# Patient Record
Sex: MALE | Race: BLACK OR AFRICAN AMERICAN | NOT HISPANIC OR LATINO
[De-identification: names, ages, dates, MRNs, and addresses within clinical notes are randomized per-mention and may not be internally consistent; named-entity substitution may affect disease eponyms.]

---

## 2022-06-14 ENCOUNTER — APPOINTMENT (OUTPATIENT)
Dept: ORTHOPEDIC SURGERY | Facility: CLINIC | Age: 67
End: 2022-06-14
Payer: MEDICARE

## 2022-06-14 VITALS — BODY MASS INDEX: 26.92 KG/M2 | WEIGHT: 188 LBS | HEIGHT: 70 IN

## 2022-06-14 DIAGNOSIS — E78.00 PURE HYPERCHOLESTEROLEMIA, UNSPECIFIED: ICD-10-CM

## 2022-06-14 PROCEDURE — 72100 X-RAY EXAM L-S SPINE 2/3 VWS: CPT

## 2022-06-14 PROCEDURE — 99215 OFFICE O/P EST HI 40 MIN: CPT

## 2022-06-14 PROCEDURE — 72170 X-RAY EXAM OF PELVIS: CPT

## 2022-06-14 RX ORDER — MELOXICAM 15 MG/1
15 TABLET ORAL
Qty: 30 | Refills: 2 | Status: ACTIVE | COMMUNITY
Start: 2022-06-14 | End: 1900-01-01

## 2022-06-14 RX ORDER — METHYLPREDNISOLONE 4 MG/1
4 TABLET ORAL
Qty: 1 | Refills: 0 | Status: ACTIVE | COMMUNITY
Start: 2022-06-14 | End: 1900-01-01

## 2022-06-14 NOTE — ASSESSMENT
[FreeTextEntry1] : 66 year M with left hip OA and lumbar strain\par - physical therapy, NSAIDs, MDP for [place]\par - Return in 6 weeks for follow up

## 2022-06-14 NOTE — IMAGING
[de-identified] : Constitutional: well developed and well nourished, able to communicate\par Cardiovascular: Peripheral vascular exam is grossly normal\par Neurologic: Alert and oriented, no acute distress.\par Skin: normal skin with no ulcers, rashes, or lesions\par Pulmonary: No respiratory distress, breathing comfortably on room air\par Lymphatics: No obvious lymphadenopathy or lymphedema in areas examined\par \par LUMBAR EXAM\par Alignment: normal\par Scoliosis: none\par Spinous process: no tenderness\par Thoracic paraspinal tenderness: no tenderness\par Lumbar Paraspinal tenderness: POS tenderness\par \par RANGE OF MOTION\par full with pain\par \par MOTOR EXAM\par Hip Flexion: 5 /5\par Knee Extension: 5 /5\par Knee Flexion: 5 /5\par Ankle Dorsiflexion: 5 /5\par Ankle plantar flexion: 5 /5\par Toe Extension: 5 /5\par \par Straight leg sign: negative\par Sensation intact L2-S1 nerve root distribution\par Toes warm and well perfused\par \par IMAGING:\par 06/14/2022 Xrays of the lumbar spine and pelvis were taken demonstrating tonnis grade 3 b/l hips and L1/2 DDD with osteophytes and L5/S1 DDD\par \par \par

## 2022-07-26 ENCOUNTER — APPOINTMENT (OUTPATIENT)
Dept: ORTHOPEDIC SURGERY | Facility: CLINIC | Age: 67
End: 2022-07-26

## 2022-11-22 ENCOUNTER — APPOINTMENT (OUTPATIENT)
Dept: ORTHOPEDIC SURGERY | Facility: CLINIC | Age: 67
End: 2022-11-22

## 2022-11-22 VITALS — BODY MASS INDEX: 26.92 KG/M2 | WEIGHT: 188 LBS | HEIGHT: 70 IN

## 2022-11-22 PROCEDURE — 99213 OFFICE O/P EST LOW 20 MIN: CPT

## 2022-11-22 NOTE — ASSESSMENT
[FreeTextEntry1] : 66 year M with left hip OA and lumbar strain\par - physical therapy, NSAIDs, MDP for [place]\par - Return in 6 weeks for follow up \par \par 11/22/2022 continues to have hip pain with likley overlying lumbar radic.\par - did not take MDP and did some PT\par - will peform dx and therapeutic hip injection under guidance.

## 2022-11-22 NOTE — IMAGING
[de-identified] : Constitutional: well developed and well nourished, able to communicate\par Cardiovascular: Peripheral vascular exam is grossly normal\par Neurologic: Alert and oriented, no acute distress.\par Skin: normal skin with no ulcers, rashes, or lesions\par Pulmonary: No respiratory distress, breathing comfortably on room air\par Lymphatics: No obvious lymphadenopathy or lymphedema in areas examined\par \par LUMBAR EXAM\par Alignment: normal\par Scoliosis: none\par Spinous process: no tenderness\par Thoracic paraspinal tenderness: no tenderness\par Lumbar Paraspinal tenderness: POS tenderness\par \par RANGE OF MOTION\par full with pain\par \par MOTOR EXAM\par Hip Flexion: 5 /5\par Knee Extension: 5 /5\par Knee Flexion: 5 /5\par Ankle Dorsiflexion: 5 /5\par Ankle plantar flexion: 5 /5\par Toe Extension: 5 /5\par \par Straight leg sign: negative\par Sensation intact L2-S1 nerve root distribution\par Toes warm and well perfused\par \par IMAGING:\par 06/14/2022 Xrays of the lumbar spine and pelvis were taken demonstrating tonnis grade 3 b/l hips and L1/2 DDD with osteophytes and L5/S1 DDD\par \par \par

## 2022-11-22 NOTE — HISTORY OF PRESENT ILLNESS
[Gradual] : gradual [7] : 7 [2] : 2 [Dull/Aching] : dull/aching [Throbbing] : throbbing [Frequent] : frequent [Meds] : meds [Heat] : heat [Nothing helps with pain getting better] : Nothing helps with pain getting better [de-identified] : This is Mr. RAMON ALAS  a 66 year male who comes in today complaining of lower back and left hip pain for months with no injury.  Pain on the lateral aspect of the hip and lower back. Denies numbness/tingling. +heat, +Aleve.\par \par 11/22/2022 Mr. RAMON ALAS M  here for eval of the left hip. Patient stated they feel worse  since their last visit. Patient mentioned having pain in the leg at the arch of the foot/calf/ thigh. \par  [] : no [FreeTextEntry9] : Aleve

## 2022-12-01 ENCOUNTER — APPOINTMENT (OUTPATIENT)
Dept: PAIN MANAGEMENT | Facility: CLINIC | Age: 67
End: 2022-12-01
Payer: MEDICARE

## 2022-12-01 PROCEDURE — 27093 INJECTION FOR HIP X-RAY: CPT | Mod: LT

## 2022-12-01 PROCEDURE — 77002 NEEDLE LOCALIZATION BY XRAY: CPT | Mod: LT

## 2022-12-01 NOTE — PROCEDURE
[FreeTextEntry3] : DATE OF SURGERY: 12/1/22\par \par OPERATIVE SITE:  Left hip\par \par PREOPERATIVE DIAGNOSIS(ES):  hip osteoarthritis \par \par POSTOPERATIVE DIAGNOSIS(ES):  hip osteoarthritis\par \par PROCEDURE(S): Left hip injection\par \par SURGEON:  Paddy Infante MD\par \par ANESTHESIA:  Local\par \par COMPLICATIONS:  None.\par \par INDICATIONS:  This patient has severe OA of the hip. In order to alleviate pain and restore mobility the patient was consented for the above procedure.  Informed consent was obtained for the procedure. \par  \par PROCEDURE IN DETAIL:\par  \par The patient was taken to the procedure room and placed onto the procedure table. The patient was prepped in the usual sterile fashion. A proper timeout was performed prior to procedure.\par  \par The proper landmarks of the ASIS and greater trochanter was marked. Fluoroscopy was used to localize the needle on the surface of the skin. 5cc of 1% lidocaine was injected subcutaneously. Next a 3.5" 22G needle was advanced towards the neck of the femur until bone was contacted. Omnipaque was injected once the hip capsule was penetrated and fluoroscopy demonstrated intra-articular localization.\par \par Afterwards the syringe was removed and 1cc of 40mg of kenalog and 4cc of 1% lidocaine was injected. A dry sterile dressing was placed at the injection site. There were no complications during the procedure.\par

## 2022-12-20 ENCOUNTER — APPOINTMENT (OUTPATIENT)
Dept: ORTHOPEDIC SURGERY | Facility: CLINIC | Age: 67
End: 2022-12-20

## 2023-01-03 ENCOUNTER — APPOINTMENT (OUTPATIENT)
Dept: ORTHOPEDIC SURGERY | Facility: CLINIC | Age: 68
End: 2023-01-03
Payer: MEDICARE

## 2023-01-03 VITALS — WEIGHT: 188 LBS | BODY MASS INDEX: 26.92 KG/M2 | HEIGHT: 70 IN

## 2023-01-03 PROCEDURE — 99213 OFFICE O/P EST LOW 20 MIN: CPT

## 2023-01-03 NOTE — REASON FOR VISIT
[FreeTextEntry2] : back/left hip\par 11/22/2022 Follow up : left hip \par 01/03/2023 Follow up : left hip; Pt had a fluoroscopy hip inj \par \par

## 2023-01-03 NOTE — IMAGING
[de-identified] : Constitutional: well developed and well nourished, able to communicate\par Cardiovascular: Peripheral vascular exam is grossly normal\par Neurologic: Alert and oriented, no acute distress.\par Skin: normal skin with no ulcers, rashes, or lesions\par Pulmonary: No respiratory distress, breathing comfortably on room air\par Lymphatics: No obvious lymphadenopathy or lymphedema in areas examined\par \par LUMBAR EXAM\par Alignment: normal\par Scoliosis: none\par Spinous process: no tenderness\par Thoracic paraspinal tenderness: no tenderness\par Lumbar Paraspinal tenderness: POS tenderness\par \par RANGE OF MOTION\par full with pain\par \par MOTOR EXAM\par Hip Flexion: 5 /5\par Knee Extension: 5 /5\par Knee Flexion: 5 /5\par Ankle Dorsiflexion: 5 /5\par Ankle plantar flexion: 5 /5\par Toe Extension: 5 /5\par \par Straight leg sign: negative\par Sensation intact L2-S1 nerve root distribution\par Toes warm and well perfused\par \par IMAGING:\par 06/14/2022 Xrays of the lumbar spine and pelvis were taken demonstrating tonnis grade 3 b/l hips and L1/2 DDD with osteophytes and L5/S1 DDD\par \par \par

## 2023-01-03 NOTE — HISTORY OF PRESENT ILLNESS
[Gradual] : gradual [1] : 2 [0] : 0 [Dull/Aching] : dull/aching [Occasional] : occasional [Walking] : walking [Retired] : Work status: retired [de-identified] : This is Mr. RAMON ALAS  a 66 year male who comes in today complaining of lower back and left hip pain for months with no injury.  Pain on the lateral aspect of the hip and lower back. Denies numbness/tingling. +heat, +Aleve.\par \par 11/22/2022 Mr. RAMON HINSON  here for eval of the left hip. Patient stated they feel worse  since their last visit. Patient mentioned having pain in the leg at the arch of the foot/calf/ thigh. \par \par 01/03/2023 Mr. RMAON HINSON  here for eval of the left hip. Patient stated his hip feels very good. States pain has mostly resolved.  No further pain down the leg. \par \par  [] : no [FreeTextEntry1] : left hip  [FreeTextEntry9] : hip injection

## 2023-01-03 NOTE — ASSESSMENT
[FreeTextEntry1] : 66 year M with left hip OA and lumbar strain\par - physical therapy, NSAIDs, MDP for [place]\par - Return in 6 weeks for follow up \par \par 11/22/2022 continues to have hip pain with likley overlying lumbar radic.\par - did not take MDP and did some PT\par - will peform dx and therapeutic hip injection under guidance.\par \par 1/3/23: Good relieve with IA CSI. Follow up PRN. Discussed possibility of eventual AMAURY.

## 2023-03-07 ENCOUNTER — APPOINTMENT (OUTPATIENT)
Dept: ORTHOPEDIC SURGERY | Facility: CLINIC | Age: 68
End: 2023-03-07
Payer: MEDICARE

## 2023-03-07 VITALS — WEIGHT: 188 LBS | BODY MASS INDEX: 26.92 KG/M2 | HEIGHT: 70 IN

## 2023-03-07 PROCEDURE — 99214 OFFICE O/P EST MOD 30 MIN: CPT

## 2023-03-07 RX ORDER — IBUPROFEN 800 MG/1
800 TABLET, FILM COATED ORAL 3 TIMES DAILY
Qty: 42 | Refills: 0 | Status: ACTIVE | COMMUNITY
Start: 2023-03-07 | End: 1900-01-01

## 2023-03-07 NOTE — IMAGING
[de-identified] : Constitutional: well developed and well nourished, able to communicate\par Cardiovascular: Peripheral vascular exam is grossly normal\par Neurologic: Alert and oriented, no acute distress.\par Skin: normal skin with no ulcers, rashes, or lesions\par Pulmonary: No respiratory distress, breathing comfortably on room air\par Lymphatics: No obvious lymphadenopathy or lymphedema in areas examined\par \par LUMBAR EXAM\par Alignment: normal\par Scoliosis: none\par Spinous process: no tenderness\par Thoracic paraspinal tenderness: no tenderness\par Lumbar Paraspinal tenderness: POS tenderness\par \par RANGE OF MOTION\par full with pain\par \par MOTOR EXAM\par Hip Flexion: 5 /5\par Knee Extension: 5 /5\par Knee Flexion: 5 /5\par Ankle Dorsiflexion: 5 /5\par Ankle plantar flexion: 5 /5\par Toe Extension: 5 /5\par \par Straight leg sign: negative\par Sensation intact L2-S1 nerve root distribution\par Toes warm and well perfused\par \par IMAGING:\par 06/14/2022 Xrays of the lumbar spine and pelvis were taken demonstrating tonnis grade 3 b/l hips and L1/2 DDD with osteophytes and L5/S1 DDD\par \par \par

## 2023-03-07 NOTE — ASSESSMENT
[FreeTextEntry1] : 66 year M with left hip OA and lumbar strain\par - physical therapy, NSAIDs, MDP for [place]\par - Return in 6 weeks for follow up \par \par 11/22/2022 continues to have hip pain with likley overlying lumbar radic.\par - did not take MDP and did some PT\par - will peform dx and therapeutic hip injection under guidance.\par \par 1/3/23: Good relieve with IA CSI. Follow up PRN. Discussed possibility of eventual AMAURY.\par \par 03/07/2023 pain is back after 4 months from injection. Follow up Dr. Evans for injection CSI

## 2023-03-07 NOTE — HISTORY OF PRESENT ILLNESS
[Gradual] : gradual [1] : 2 [0] : 0 [Dull/Aching] : dull/aching [Occasional] : occasional [Walking] : walking [Retired] : Work status: retired [de-identified] : This is Mr. RAMON ALAS  a 66 year male who comes in today complaining of lower back and left hip pain for months with no injury.  Pain on the lateral aspect of the hip and lower back. Denies numbness/tingling. +heat, +Aleve.\par \par 11/22/2022 Mr. RAMON HINSON  here for eval of the left hip. Patient stated they feel worse  since their last visit. Patient mentioned having pain in the leg at the arch of the foot/calf/ thigh. \par \par 01/03/2023 Mr. RAMON ALAS M  here for eval of the left hip. Patient stated his hip feels very good. States pain has mostly resolved.  No further pain down the leg. \par \par  03/07/2023 follow up left hip/ States he is feeling worse since last visit. it appears injection is wearing off.\par \par  [] : no [FreeTextEntry1] : left hip  [FreeTextEntry9] : hip injection

## 2023-03-16 ENCOUNTER — APPOINTMENT (OUTPATIENT)
Age: 68
End: 2023-03-16
Payer: MEDICARE

## 2023-03-16 PROCEDURE — 73525 CONTRAST X-RAY OF HIP: CPT | Mod: 26

## 2023-03-16 PROCEDURE — 27093 INJECTION FOR HIP X-RAY: CPT | Mod: LT

## 2023-08-15 ENCOUNTER — APPOINTMENT (OUTPATIENT)
Dept: ORTHOPEDIC SURGERY | Facility: CLINIC | Age: 68
End: 2023-08-15
Payer: MEDICARE

## 2023-08-15 VITALS — WEIGHT: 188 LBS | BODY MASS INDEX: 26.92 KG/M2 | HEIGHT: 70 IN

## 2023-08-15 PROCEDURE — 73503 X-RAY EXAM HIP UNI 4/> VIEWS: CPT | Mod: LT

## 2023-08-15 PROCEDURE — 99214 OFFICE O/P EST MOD 30 MIN: CPT

## 2023-08-15 NOTE — HISTORY OF PRESENT ILLNESS
[Gradual] : gradual [1] : 2 [0] : 0 [Dull/Aching] : dull/aching [Occasional] : occasional [Walking] : walking [Retired] : Work status: retired [de-identified] : This is Mr. RAMON ALAS  a 66 year male who comes in today complaining of lower back and left hip pain for months with no injury.  Pain on the lateral aspect of the hip and lower back. Denies numbness/tingling. +heat, +Aleve.  11/22/2022 Mr. RAMON HINSON  here for eval of the left hip. Patient stated they feel worse  since their last visit. Patient mentioned having pain in the leg at the arch of the foot/calf/ thigh.   01/03/2023 Mr. RAMON HINSON  here for eval of the left hip. Patient stated his hip feels very good. States pain has mostly resolved.  No further pain down the leg.    03/07/2023 follow up left hip/ States he is feeling worse since last visit. it appears injection is wearing off.  08/15/2023 follow up left hip. States CSI helped temporarily. Did not last as long as the previous one.  [] : no [FreeTextEntry1] : left hip  [FreeTextEntry9] : hip injection

## 2023-08-15 NOTE — IMAGING
[de-identified] : Constitutional: well developed and well nourished, able to communicate\par  Cardiovascular: Peripheral vascular exam is grossly normal\par  Neurologic: Alert and oriented, no acute distress.\par  Skin: normal skin with no ulcers, rashes, or lesions\par  Pulmonary: No respiratory distress, breathing comfortably on room air\par  Lymphatics: No obvious lymphadenopathy or lymphedema in areas examined\par  \par  LUMBAR EXAM\par  Alignment: normal\par  Scoliosis: none\par  Spinous process: no tenderness\par  Thoracic paraspinal tenderness: no tenderness\par  Lumbar Paraspinal tenderness: POS tenderness\par  \par  RANGE OF MOTION\par  full with pain\par  \par  MOTOR EXAM\par  Hip Flexion: 5 /5\par  Knee Extension: 5 /5\par  Knee Flexion: 5 /5\par  Ankle Dorsiflexion: 5 /5\par  Ankle plantar flexion: 5 /5\par  Toe Extension: 5 /5\par  \par  Straight leg sign: negative\par  Sensation intact L2-S1 nerve root distribution\par  Toes warm and well perfused\par  \par  IMAGING:\par  06/14/2022 Xrays of the lumbar spine and pelvis were taken demonstrating tonnis grade 3 b/l hips and L1/2 DDD with osteophytes and L5/S1 DDD\par  \par  \par

## 2023-08-15 NOTE — ASSESSMENT
[FreeTextEntry1] : 66 year M with left hip OA and lumbar strain - physical therapy, NSAIDs, MDP for [place] - Return in 6 weeks for follow up   11/22/2022 continues to have hip pain with likley overlying lumbar radic. - did not take MDP and did some PT - will peform dx and therapeutic hip injection under guidance.  1/3/23: Good relieve with IA CSI. Follow up PRN. Discussed possibility of eventual AMAURY.  03/07/2023 pain is back after 4 months from injection. Follow up Dr. Evans for injection CSI  8/15/23: all preop questions and concerns answered

## 2023-09-12 ENCOUNTER — OUTPATIENT (OUTPATIENT)
Dept: OUTPATIENT SERVICES | Facility: HOSPITAL | Age: 68
LOS: 1 days | Discharge: HOME | End: 2023-09-12

## 2023-09-12 ENCOUNTER — APPOINTMENT (OUTPATIENT)
Dept: ORTHOPEDIC SURGERY | Facility: CLINIC | Age: 68
End: 2023-09-12
Payer: MEDICARE

## 2023-09-12 VITALS
RESPIRATION RATE: 17 BRPM | HEART RATE: 75 BPM | TEMPERATURE: 98 F | HEIGHT: 70 IN | SYSTOLIC BLOOD PRESSURE: 124 MMHG | OXYGEN SATURATION: 98 % | DIASTOLIC BLOOD PRESSURE: 88 MMHG | WEIGHT: 198.64 LBS

## 2023-09-12 DIAGNOSIS — M16.12 UNILATERAL PRIMARY OSTEOARTHRITIS, LEFT HIP: ICD-10-CM

## 2023-09-12 DIAGNOSIS — M16.11 UNILATERAL PRIMARY OSTEOARTHRITIS, RIGHT HIP: ICD-10-CM

## 2023-09-12 DIAGNOSIS — Z98.890 OTHER SPECIFIED POSTPROCEDURAL STATES: Chronic | ICD-10-CM

## 2023-09-12 DIAGNOSIS — Z01.818 ENCOUNTER FOR OTHER PREPROCEDURAL EXAMINATION: ICD-10-CM

## 2023-09-12 DIAGNOSIS — E78.5 HYPERLIPIDEMIA, UNSPECIFIED: ICD-10-CM

## 2023-09-12 DIAGNOSIS — E11.9 TYPE 2 DIABETES MELLITUS WITHOUT COMPLICATIONS: ICD-10-CM

## 2023-09-12 DIAGNOSIS — J38.1 POLYP OF VOCAL CORD AND LARYNX: Chronic | ICD-10-CM

## 2023-09-12 DIAGNOSIS — Z01.812 ENCOUNTER FOR PREPROCEDURAL LABORATORY EXAMINATION: ICD-10-CM

## 2023-09-12 LAB
A1C WITH ESTIMATED AVERAGE GLUCOSE RESULT: 6.1 % — HIGH (ref 4–5.6)
ALBUMIN SERPL ELPH-MCNC: 4.1 G/DL — SIGNIFICANT CHANGE UP (ref 3.3–5)
ALP SERPL-CCNC: 66 U/L — SIGNIFICANT CHANGE UP (ref 40–120)
ALT FLD-CCNC: 47 U/L — SIGNIFICANT CHANGE UP (ref 12–78)
ANION GAP SERPL CALC-SCNC: 4 MMOL/L — LOW (ref 5–17)
APTT BLD: 35.8 SEC — HIGH (ref 24.5–35.6)
AST SERPL-CCNC: 28 U/L — SIGNIFICANT CHANGE UP (ref 15–37)
BASOPHILS # BLD AUTO: 0.05 K/UL — SIGNIFICANT CHANGE UP (ref 0–0.2)
BASOPHILS NFR BLD AUTO: 0.7 % — SIGNIFICANT CHANGE UP (ref 0–2)
BILIRUB SERPL-MCNC: 0.5 MG/DL — SIGNIFICANT CHANGE UP (ref 0.2–1.2)
BLD GP AB SCN SERPL QL: SIGNIFICANT CHANGE UP
BUN SERPL-MCNC: 15 MG/DL — SIGNIFICANT CHANGE UP (ref 7–23)
CALCIUM SERPL-MCNC: 9.4 MG/DL — SIGNIFICANT CHANGE UP (ref 8.5–10.1)
CHLORIDE SERPL-SCNC: 110 MMOL/L — HIGH (ref 96–108)
CO2 SERPL-SCNC: 27 MMOL/L — SIGNIFICANT CHANGE UP (ref 22–31)
CREAT SERPL-MCNC: 1.13 MG/DL — SIGNIFICANT CHANGE UP (ref 0.5–1.3)
EGFR: 72 ML/MIN/1.73M2 — SIGNIFICANT CHANGE UP
EOSINOPHIL # BLD AUTO: 0.15 K/UL — SIGNIFICANT CHANGE UP (ref 0–0.5)
EOSINOPHIL NFR BLD AUTO: 2 % — SIGNIFICANT CHANGE UP (ref 0–6)
ESTIMATED AVERAGE GLUCOSE: 128 MG/DL — HIGH (ref 68–114)
GLUCOSE SERPL-MCNC: 104 MG/DL — HIGH (ref 70–99)
HCT VFR BLD CALC: 47.3 % — SIGNIFICANT CHANGE UP (ref 39–50)
HGB BLD-MCNC: 15.6 G/DL — SIGNIFICANT CHANGE UP (ref 13–17)
IMM GRANULOCYTES NFR BLD AUTO: 0.1 % — SIGNIFICANT CHANGE UP (ref 0–0.9)
INR BLD: 0.88 RATIO — SIGNIFICANT CHANGE UP (ref 0.85–1.18)
LYMPHOCYTES # BLD AUTO: 2.04 K/UL — SIGNIFICANT CHANGE UP (ref 1–3.3)
LYMPHOCYTES # BLD AUTO: 27.5 % — SIGNIFICANT CHANGE UP (ref 13–44)
MCHC RBC-ENTMCNC: 30.2 PG — SIGNIFICANT CHANGE UP (ref 27–34)
MCHC RBC-ENTMCNC: 33 G/DL — SIGNIFICANT CHANGE UP (ref 32–36)
MCV RBC AUTO: 91.7 FL — SIGNIFICANT CHANGE UP (ref 80–100)
MONOCYTES # BLD AUTO: 0.6 K/UL — SIGNIFICANT CHANGE UP (ref 0–0.9)
MONOCYTES NFR BLD AUTO: 8.1 % — SIGNIFICANT CHANGE UP (ref 2–14)
NEUTROPHILS # BLD AUTO: 4.56 K/UL — SIGNIFICANT CHANGE UP (ref 1.8–7.4)
NEUTROPHILS NFR BLD AUTO: 61.6 % — SIGNIFICANT CHANGE UP (ref 43–77)
NRBC # BLD: 0 /100 WBCS — SIGNIFICANT CHANGE UP (ref 0–0)
PLATELET # BLD AUTO: 225 K/UL — SIGNIFICANT CHANGE UP (ref 150–400)
POTASSIUM SERPL-MCNC: 4.5 MMOL/L — SIGNIFICANT CHANGE UP (ref 3.5–5.3)
POTASSIUM SERPL-SCNC: 4.5 MMOL/L — SIGNIFICANT CHANGE UP (ref 3.5–5.3)
PROT SERPL-MCNC: 8.1 GM/DL — SIGNIFICANT CHANGE UP (ref 6–8.3)
PROTHROM AB SERPL-ACNC: 10.5 SEC — SIGNIFICANT CHANGE UP (ref 9.5–13)
RBC # BLD: 5.16 M/UL — SIGNIFICANT CHANGE UP (ref 4.2–5.8)
RBC # FLD: 12.7 % — SIGNIFICANT CHANGE UP (ref 10.3–14.5)
SODIUM SERPL-SCNC: 141 MMOL/L — SIGNIFICANT CHANGE UP (ref 135–145)
WBC # BLD: 7.41 K/UL — SIGNIFICANT CHANGE UP (ref 3.8–10.5)
WBC # FLD AUTO: 7.41 K/UL — SIGNIFICANT CHANGE UP (ref 3.8–10.5)

## 2023-09-12 PROCEDURE — 99215 OFFICE O/P EST HI 40 MIN: CPT

## 2023-09-12 NOTE — PHYSICAL THERAPY INITIAL EVALUATION ADULT - ADDITIONAL COMMENTS
Home set-up: lives with domestic partner 2nd floor of private house with 6 stair steps to enter with x2 handrails, wide apart to enter at front, then another 6 steps to 2nd floor c closer x2 rails. Bedroom is & bathroom at same floor. Bathroom is a step-in shower without grab rail but with a shower stool; a standard height toilet seat, with fixed shower head. Endorses will be supported by partner post-op. Patient is currently independent with mobility. Patient endorses typical pain at best is 5/10, at worst is 8/10. Per patient, pain is worse with increased standing, walking and sometimes turning. Pain is relieved by sitting/resting. Patient is (R)-handed and drives; wears progressive glasses. Patient denies falls in past 6 months. Sometimes terence when turning.

## 2023-09-12 NOTE — PHYSICAL THERAPY INITIAL EVALUATION ADULT - SINGLE LEG BALANCE TEST, REHAB EVAL
One Leg Stand Test (OLST): Right: 5 secs Left: 5 secs.  Functional test to assess fall risk. Both gait and stair negotiation require components of OLST. Participants unable to perform the OLST for at least 5 seconds are at increased risk for injurious fall.

## 2023-09-12 NOTE — H&P PST ADULT - ASSESSMENT
left hip osteoarthritis  CAPRINI SCORE    AGE RELATED RISK FACTORS                                                       MOBILITY RELATED FACTORS  [ ] Age 41-60 years                                            (1 Point)                  [ ] Bed rest                                                        (1 Point)  [x ] Age: 61-74 years                                           (2 Points)                [ ] Plaster cast                                                   (2 Points)  [ ] Age= 75 years                                              (3 Points)                 [ ] Bed bound for more than 72 hours                   (2 Points)    DISEASE RELATED RISK FACTORS                                               GENDER SPECIFIC FACTORS  [ ] Edema in the lower extremities                       (1 Point)                  [ ] Pregnancy                                                     (1 Point)  [ ] Varicose veins                                               (1 Point)                  [ ] Post-partum < 6 weeks                                   (1 Point)             [x ] BMI > 25 Kg/m2                                            (1 Point)                  [ ] Hormonal therapy  or oral contraception            (1 Point)                 [ ] Sepsis (in the previous month)                        (1 Point)                  [ ] History of pregnancy complications  [ ] Pneumonia or serious lung disease                                               [ ] Unexplained or recurrent                       (1 Point)           (in the previous month)                               (1 Point)  [ ] Abnormal pulmonary function test                     (1 Point)                 SURGERY RELATED RISK FACTORS  [ ] Acute myocardial infarction                              (1 Point)                 [ ]  Section                                            (1 Point)  [ ] Congestive heart failure (in the previous month)  (1 Point)                 [ ] Minor surgery                                                 (1 Point)   [ ] Inflammatory bowel disease                             (1 Point)                 [ ] Arthroscopic surgery                                        (2 Points)  [ ] Central venous access                                    (2 Points)                [ ] General surgery lasting more than 45 minutes   (2 Points)       [ ] Stroke (in the previous month)                          (5 Points)               [x ] Elective arthroplasty                                        (5 Points)                                                                                                                                               HEMATOLOGY RELATED FACTORS                                                 TRAUMA RELATED RISK FACTORS  [ ] Prior episodes of VTE                                     (3 Points)                 [ ] Fracture of the hip, pelvis, or leg                       (5 Points)  [ ] Positive family history for VTE                         (3 Points)                 [ ] Acute spinal cord injury (in the previous month)  (5 Points)  [ ] Prothrombin 56438 A                                      (3 Points)                 [ ] Paralysis  (less than 1 month)                          (5 Points)  [ ] Factor V Leiden                                             (3 Points)                 [ ] Multiple Trauma within 1 month                         (5 Points)  [ ] Lupus anticoagulants                                     (3 Points)                                                           [ ] Anticardiolipin antibodies                                (3 Points)                                                       [ ] High homocysteine in the blood                      (3 Points)                                             [ ] Other congenital or acquired thrombophilia       (3 Points)                                                [ ] Heparin induced thrombocytopenia                  (3 Points)                                          Total Score [    8      ]   Caprini Score 0-2: Low risk, No VTE Prophylaxis required for most patient's, encourage ambulation  Caprini Score 3-6: At Risk, Pharmacologic VTE prophylaxis is indicated for most patients ( in the absence of a contraindication)  Caprini Score Greater than or = 7: High Risk , pharmacologic VTE is indicated for most patients ( in the absence of a contraindication)    Caprini score indicates that the patient is high risk for VTE event ( score 6 or greater). Surgical patient's in this group will benefit from both pharmacologic prophylaxis and intermittent compression devices . Surgical team will determine the balance between VTE  risk and bleeding risk and other clinical considerations

## 2023-09-12 NOTE — H&P PST ADULT - HISTORY OF PRESENT ILLNESS
o male , pmh- htn, hld, dm c/o left hip pain 2/2 osteoarthritis - scheduled for left  hip arthroplasty  goal: to walk without pain   denies recent travels in the past 30 days. No fever, SOB, cough, flu like symptoms or body rash- covid screen

## 2023-09-12 NOTE — H&P PST ADULT - ATTENDING COMMENTS
I discussed this dx with the family at length. In order to restore the patient's ability to ambulate with minimal pain, a LEFT anterior total hip replacement was recommended. The risks of the procedure were discussed at length which included but not limited to infection, bleeding, and damage to neurovascular structures. Informed consent was obtained.

## 2023-09-12 NOTE — PHYSICAL THERAPY INITIAL EVALUATION ADULT - GENERAL OBSERVATIONS, REHAB EVAL
no rashes , no suspicious lesions , no areas of discoloration , no jaundice present , good turgor , no masses , no tenderness on palpation
Patient encountered sitting in PST waiting area, agreeable to PT pre-op evaluation. Patient is for elective (L) total hip arthroplasty (uncertain at this time if anterior or posterior approach) at a later date from now. Tolerated all aspects of evaluation without undue events, please see further PT documentation for details.

## 2023-09-12 NOTE — PHYSICAL THERAPY INITIAL EVALUATION ADULT - PERTINENT HX OF CURRENT PROBLEM, REHAB EVAL
Patient attends Pre-Op Testing today following consult with Dr. Infante due to chronic pain to (L) hip. Significant surgical/medical histories of throat polypectomy, (L) knee arthroscopy. Elective LTHA is now scheduled in this facility for 10/2/23.

## 2023-09-13 LAB
MRSA PCR RESULT.: SIGNIFICANT CHANGE UP
S AUREUS DNA NOSE QL NAA+PROBE: SIGNIFICANT CHANGE UP
VIT D25+D1,25 OH+D1,25 PNL SERPL-MCNC: 40.7 PG/ML — SIGNIFICANT CHANGE UP (ref 19.9–79.3)

## 2023-09-13 RX ORDER — SODIUM CHLORIDE 9 MG/ML
3 INJECTION INTRAMUSCULAR; INTRAVENOUS; SUBCUTANEOUS EVERY 8 HOURS
Refills: 0 | Status: DISCONTINUED | OUTPATIENT
Start: 2023-10-02 | End: 2023-10-03

## 2023-09-28 RX ORDER — INSULIN LISPRO 100/ML
VIAL (ML) SUBCUTANEOUS
Refills: 0 | Status: DISCONTINUED | OUTPATIENT
Start: 2023-10-02 | End: 2023-10-03

## 2023-09-28 RX ORDER — SODIUM CHLORIDE 9 MG/ML
1000 INJECTION, SOLUTION INTRAVENOUS
Refills: 0 | Status: DISCONTINUED | OUTPATIENT
Start: 2023-10-02 | End: 2023-10-03

## 2023-09-28 RX ORDER — INSULIN LISPRO 100/ML
VIAL (ML) SUBCUTANEOUS AT BEDTIME
Refills: 0 | Status: DISCONTINUED | OUTPATIENT
Start: 2023-10-02 | End: 2023-10-03

## 2023-09-28 RX ORDER — GABAPENTIN 400 MG/1
100 CAPSULE ORAL THREE TIMES A DAY
Refills: 0 | Status: DISCONTINUED | OUTPATIENT
Start: 2023-10-02 | End: 2023-10-03

## 2023-09-28 RX ORDER — SENNA PLUS 8.6 MG/1
2 TABLET ORAL AT BEDTIME
Refills: 0 | Status: DISCONTINUED | OUTPATIENT
Start: 2023-10-02 | End: 2023-10-03

## 2023-09-28 RX ORDER — GLUCAGON INJECTION, SOLUTION 0.5 MG/.1ML
1 INJECTION, SOLUTION SUBCUTANEOUS ONCE
Refills: 0 | Status: DISCONTINUED | OUTPATIENT
Start: 2023-10-02 | End: 2023-10-03

## 2023-09-28 RX ORDER — ATORVASTATIN CALCIUM 80 MG/1
80 TABLET, FILM COATED ORAL AT BEDTIME
Refills: 0 | Status: DISCONTINUED | OUTPATIENT
Start: 2023-10-02 | End: 2023-10-03

## 2023-09-28 RX ORDER — DEXTROSE 50 % IN WATER 50 %
15 SYRINGE (ML) INTRAVENOUS ONCE
Refills: 0 | Status: DISCONTINUED | OUTPATIENT
Start: 2023-10-02 | End: 2023-10-03

## 2023-09-28 RX ORDER — ACETAMINOPHEN 500 MG
1000 TABLET ORAL ONCE
Refills: 0 | Status: COMPLETED | OUTPATIENT
Start: 2023-10-02 | End: 2023-10-02

## 2023-09-28 RX ORDER — OXYCODONE HYDROCHLORIDE 5 MG/1
10 TABLET ORAL
Refills: 0 | Status: DISCONTINUED | OUTPATIENT
Start: 2023-10-02 | End: 2023-10-03

## 2023-09-28 RX ORDER — ONDANSETRON 8 MG/1
4 TABLET, FILM COATED ORAL EVERY 6 HOURS
Refills: 0 | Status: DISCONTINUED | OUTPATIENT
Start: 2023-10-02 | End: 2023-10-03

## 2023-09-28 RX ORDER — OXYCODONE HYDROCHLORIDE 5 MG/1
5 TABLET ORAL
Refills: 0 | Status: DISCONTINUED | OUTPATIENT
Start: 2023-10-02 | End: 2023-10-03

## 2023-09-28 RX ORDER — ASPIRIN/CALCIUM CARB/MAGNESIUM 324 MG
81 TABLET ORAL
Refills: 0 | Status: DISCONTINUED | OUTPATIENT
Start: 2023-10-03 | End: 2023-10-03

## 2023-09-28 RX ORDER — DEXTROSE 50 % IN WATER 50 %
25 SYRINGE (ML) INTRAVENOUS ONCE
Refills: 0 | Status: DISCONTINUED | OUTPATIENT
Start: 2023-10-02 | End: 2023-10-03

## 2023-09-28 RX ORDER — POLYETHYLENE GLYCOL 3350 17 G/17G
17 POWDER, FOR SOLUTION ORAL AT BEDTIME
Refills: 0 | Status: DISCONTINUED | OUTPATIENT
Start: 2023-10-02 | End: 2023-10-03

## 2023-09-28 RX ORDER — MAGNESIUM HYDROXIDE 400 MG/1
30 TABLET, CHEWABLE ORAL DAILY
Refills: 0 | Status: DISCONTINUED | OUTPATIENT
Start: 2023-10-02 | End: 2023-10-03

## 2023-09-28 RX ORDER — ACETAMINOPHEN 500 MG
1000 TABLET ORAL EVERY 8 HOURS
Refills: 0 | Status: DISCONTINUED | OUTPATIENT
Start: 2023-10-02 | End: 2023-10-03

## 2023-09-28 RX ORDER — SIMVASTATIN 20 MG/1
10 TABLET, FILM COATED ORAL AT BEDTIME
Refills: 0 | Status: DISCONTINUED | OUTPATIENT
Start: 2023-10-02 | End: 2023-10-03

## 2023-09-28 RX ORDER — ASCORBIC ACID 60 MG
500 TABLET,CHEWABLE ORAL
Refills: 0 | Status: DISCONTINUED | OUTPATIENT
Start: 2023-10-02 | End: 2023-10-03

## 2023-09-28 RX ORDER — PANTOPRAZOLE SODIUM 20 MG/1
40 TABLET, DELAYED RELEASE ORAL
Refills: 0 | Status: DISCONTINUED | OUTPATIENT
Start: 2023-10-02 | End: 2023-10-03

## 2023-09-28 RX ORDER — CELECOXIB 200 MG/1
200 CAPSULE ORAL EVERY 12 HOURS
Refills: 0 | Status: DISCONTINUED | OUTPATIENT
Start: 2023-10-03 | End: 2023-10-03

## 2023-09-28 RX ORDER — DEXAMETHASONE 0.5 MG/5ML
8 ELIXIR ORAL ONCE
Refills: 0 | Status: COMPLETED | OUTPATIENT
Start: 2023-10-03 | End: 2023-10-03

## 2023-09-28 RX ORDER — DEXTROSE 50 % IN WATER 50 %
12.5 SYRINGE (ML) INTRAVENOUS ONCE
Refills: 0 | Status: DISCONTINUED | OUTPATIENT
Start: 2023-10-02 | End: 2023-10-03

## 2023-09-28 RX ORDER — HYDROMORPHONE HYDROCHLORIDE 2 MG/ML
0.5 INJECTION INTRAMUSCULAR; INTRAVENOUS; SUBCUTANEOUS
Refills: 0 | Status: DISCONTINUED | OUTPATIENT
Start: 2023-10-02 | End: 2023-10-03

## 2023-09-28 RX ORDER — LANOLIN ALCOHOL/MO/W.PET/CERES
3 CREAM (GRAM) TOPICAL AT BEDTIME
Refills: 0 | Status: DISCONTINUED | OUTPATIENT
Start: 2023-10-02 | End: 2023-10-03

## 2023-10-02 ENCOUNTER — APPOINTMENT (OUTPATIENT)
Dept: ORTHOPEDIC SURGERY | Facility: HOSPITAL | Age: 68
End: 2023-10-02
Payer: MEDICARE

## 2023-10-02 ENCOUNTER — TRANSCRIPTION ENCOUNTER (OUTPATIENT)
Age: 68
End: 2023-10-02

## 2023-10-02 ENCOUNTER — INPATIENT (INPATIENT)
Facility: HOSPITAL | Age: 68
LOS: 0 days | Discharge: HOME HEALTH | End: 2023-10-03
Attending: ORTHOPAEDIC SURGERY | Admitting: ORTHOPAEDIC SURGERY

## 2023-10-02 VITALS
DIASTOLIC BLOOD PRESSURE: 87 MMHG | TEMPERATURE: 98 F | HEIGHT: 70 IN | WEIGHT: 194.01 LBS | SYSTOLIC BLOOD PRESSURE: 130 MMHG | HEART RATE: 87 BPM | RESPIRATION RATE: 17 BRPM | OXYGEN SATURATION: 98 %

## 2023-10-02 DIAGNOSIS — Z98.890 OTHER SPECIFIED POSTPROCEDURAL STATES: Chronic | ICD-10-CM

## 2023-10-02 DIAGNOSIS — J38.1 POLYP OF VOCAL CORD AND LARYNX: Chronic | ICD-10-CM

## 2023-10-02 LAB
ANION GAP SERPL CALC-SCNC: 5 MMOL/L — SIGNIFICANT CHANGE UP (ref 5–17)
BUN SERPL-MCNC: 13 MG/DL — SIGNIFICANT CHANGE UP (ref 7–23)
CALCIUM SERPL-MCNC: 8.1 MG/DL — LOW (ref 8.5–10.1)
CHLORIDE SERPL-SCNC: 110 MMOL/L — HIGH (ref 96–108)
CO2 SERPL-SCNC: 26 MMOL/L — SIGNIFICANT CHANGE UP (ref 22–31)
CREAT SERPL-MCNC: 1.13 MG/DL — SIGNIFICANT CHANGE UP (ref 0.5–1.3)
EGFR: 71 ML/MIN/1.73M2 — SIGNIFICANT CHANGE UP
GLUCOSE BLDC GLUCOMTR-MCNC: 101 MG/DL — HIGH (ref 70–99)
GLUCOSE BLDC GLUCOMTR-MCNC: 174 MG/DL — HIGH (ref 70–99)
GLUCOSE BLDC GLUCOMTR-MCNC: 177 MG/DL — HIGH (ref 70–99)
GLUCOSE BLDC GLUCOMTR-MCNC: 199 MG/DL — HIGH (ref 70–99)
GLUCOSE SERPL-MCNC: 161 MG/DL — HIGH (ref 70–99)
HCT VFR BLD CALC: 40.8 % — SIGNIFICANT CHANGE UP (ref 39–50)
HGB BLD-MCNC: 13.5 G/DL — SIGNIFICANT CHANGE UP (ref 13–17)
MCHC RBC-ENTMCNC: 30.5 PG — SIGNIFICANT CHANGE UP (ref 27–34)
MCHC RBC-ENTMCNC: 33.1 G/DL — SIGNIFICANT CHANGE UP (ref 32–36)
MCV RBC AUTO: 92.3 FL — SIGNIFICANT CHANGE UP (ref 80–100)
NRBC # BLD: 0 /100 WBCS — SIGNIFICANT CHANGE UP (ref 0–0)
PLATELET # BLD AUTO: 184 K/UL — SIGNIFICANT CHANGE UP (ref 150–400)
POTASSIUM SERPL-MCNC: 4.8 MMOL/L — SIGNIFICANT CHANGE UP (ref 3.5–5.3)
POTASSIUM SERPL-SCNC: 4.8 MMOL/L — SIGNIFICANT CHANGE UP (ref 3.5–5.3)
RBC # BLD: 4.42 M/UL — SIGNIFICANT CHANGE UP (ref 4.2–5.8)
RBC # FLD: 12.7 % — SIGNIFICANT CHANGE UP (ref 10.3–14.5)
SODIUM SERPL-SCNC: 141 MMOL/L — SIGNIFICANT CHANGE UP (ref 135–145)
WBC # BLD: 9.79 K/UL — SIGNIFICANT CHANGE UP (ref 3.8–10.5)
WBC # FLD AUTO: 9.79 K/UL — SIGNIFICANT CHANGE UP (ref 3.8–10.5)

## 2023-10-02 PROCEDURE — 27130 TOTAL HIP ARTHROPLASTY: CPT | Mod: LT

## 2023-10-02 DEVICE — SHELL ACET G7 OSSEOTI F HEMISPHR 4 HOLE 54MM: Type: IMPLANTABLE DEVICE | Site: LEFT | Status: FUNCTIONAL

## 2023-10-02 DEVICE — HEAD BIOLOX DELTA 36MM PLUS3.5MM: Type: IMPLANTABLE DEVICE | Site: LEFT | Status: FUNCTIONAL

## 2023-10-02 DEVICE — IMPLANTABLE DEVICE: Type: IMPLANTABLE DEVICE | Site: LEFT | Status: FUNCTIONAL

## 2023-10-02 DEVICE — LINER ACET VIT E G7 NEUT SZ F 36MM: Type: IMPLANTABLE DEVICE | Site: LEFT | Status: FUNCTIONAL

## 2023-10-02 RX ORDER — ONDANSETRON 8 MG/1
4 TABLET, FILM COATED ORAL ONCE
Refills: 0 | Status: DISCONTINUED | OUTPATIENT
Start: 2023-10-02 | End: 2023-10-02

## 2023-10-02 RX ORDER — METFORMIN HYDROCHLORIDE 850 MG/1
500 TABLET ORAL DAILY
Refills: 0 | Status: DISCONTINUED | OUTPATIENT
Start: 2023-10-03 | End: 2023-10-03

## 2023-10-02 RX ORDER — METFORMIN HYDROCHLORIDE 850 MG/1
500 TABLET ORAL ONCE
Refills: 0 | Status: COMPLETED | OUTPATIENT
Start: 2023-10-02 | End: 2023-10-02

## 2023-10-02 RX ORDER — ACETAMINOPHEN 500 MG
650 TABLET ORAL ONCE
Refills: 0 | Status: COMPLETED | OUTPATIENT
Start: 2023-10-02 | End: 2023-10-02

## 2023-10-02 RX ORDER — HYDROMORPHONE HYDROCHLORIDE 2 MG/ML
0.5 INJECTION INTRAMUSCULAR; INTRAVENOUS; SUBCUTANEOUS
Refills: 0 | Status: DISCONTINUED | OUTPATIENT
Start: 2023-10-02 | End: 2023-10-02

## 2023-10-02 RX ORDER — FENTANYL CITRATE 50 UG/ML
25 INJECTION INTRAVENOUS
Refills: 0 | Status: DISCONTINUED | OUTPATIENT
Start: 2023-10-02 | End: 2023-10-02

## 2023-10-02 RX ORDER — KETOROLAC TROMETHAMINE 30 MG/ML
15 SYRINGE (ML) INJECTION EVERY 6 HOURS
Refills: 0 | Status: DISCONTINUED | OUTPATIENT
Start: 2023-10-02 | End: 2023-10-03

## 2023-10-02 RX ORDER — INFLUENZA VIRUS VACCINE 15; 15; 15; 15 UG/.5ML; UG/.5ML; UG/.5ML; UG/.5ML
0.7 SUSPENSION INTRAMUSCULAR ONCE
Refills: 0 | Status: DISCONTINUED | OUTPATIENT
Start: 2023-10-02 | End: 2023-10-03

## 2023-10-02 RX ORDER — SODIUM CHLORIDE 9 MG/ML
1000 INJECTION, SOLUTION INTRAVENOUS
Refills: 0 | Status: DISCONTINUED | OUTPATIENT
Start: 2023-10-02 | End: 2023-10-02

## 2023-10-02 RX ORDER — CELECOXIB 200 MG/1
200 CAPSULE ORAL ONCE
Refills: 0 | Status: COMPLETED | OUTPATIENT
Start: 2023-10-02 | End: 2023-10-02

## 2023-10-02 RX ORDER — CEFAZOLIN SODIUM 1 G
2000 VIAL (EA) INJECTION EVERY 8 HOURS
Refills: 0 | Status: COMPLETED | OUTPATIENT
Start: 2023-10-02 | End: 2023-10-02

## 2023-10-02 RX ADMIN — SODIUM CHLORIDE 75 MILLILITER(S): 9 INJECTION, SOLUTION INTRAVENOUS at 11:07

## 2023-10-02 RX ADMIN — OXYCODONE HYDROCHLORIDE 10 MILLIGRAM(S): 5 TABLET ORAL at 17:16

## 2023-10-02 RX ADMIN — CELECOXIB 200 MILLIGRAM(S): 200 CAPSULE ORAL at 07:07

## 2023-10-02 RX ADMIN — GABAPENTIN 100 MILLIGRAM(S): 400 CAPSULE ORAL at 15:16

## 2023-10-02 RX ADMIN — ATORVASTATIN CALCIUM 80 MILLIGRAM(S): 80 TABLET, FILM COATED ORAL at 21:53

## 2023-10-02 RX ADMIN — OXYCODONE HYDROCHLORIDE 10 MILLIGRAM(S): 5 TABLET ORAL at 13:50

## 2023-10-02 RX ADMIN — Medication 15 MILLIGRAM(S): at 18:24

## 2023-10-02 RX ADMIN — OXYCODONE HYDROCHLORIDE 10 MILLIGRAM(S): 5 TABLET ORAL at 21:53

## 2023-10-02 RX ADMIN — POLYETHYLENE GLYCOL 3350 17 GRAM(S): 17 POWDER, FOR SOLUTION ORAL at 21:54

## 2023-10-02 RX ADMIN — OXYCODONE HYDROCHLORIDE 10 MILLIGRAM(S): 5 TABLET ORAL at 16:51

## 2023-10-02 RX ADMIN — Medication 500 MILLIGRAM(S): at 18:24

## 2023-10-02 RX ADMIN — Medication 1000 MILLIGRAM(S): at 17:16

## 2023-10-02 RX ADMIN — Medication 100 MILLIGRAM(S): at 23:40

## 2023-10-02 RX ADMIN — GABAPENTIN 100 MILLIGRAM(S): 400 CAPSULE ORAL at 21:54

## 2023-10-02 RX ADMIN — SIMVASTATIN 10 MILLIGRAM(S): 20 TABLET, FILM COATED ORAL at 23:47

## 2023-10-02 RX ADMIN — OXYCODONE HYDROCHLORIDE 10 MILLIGRAM(S): 5 TABLET ORAL at 22:45

## 2023-10-02 RX ADMIN — Medication 100 MILLIGRAM(S): at 15:59

## 2023-10-02 RX ADMIN — Medication 650 MILLIGRAM(S): at 07:07

## 2023-10-02 RX ADMIN — Medication 15 MILLIGRAM(S): at 23:55

## 2023-10-02 RX ADMIN — SENNA PLUS 2 TABLET(S): 8.6 TABLET ORAL at 21:54

## 2023-10-02 RX ADMIN — Medication 400 MILLIGRAM(S): at 16:16

## 2023-10-02 RX ADMIN — Medication 1: at 16:16

## 2023-10-02 RX ADMIN — OXYCODONE HYDROCHLORIDE 10 MILLIGRAM(S): 5 TABLET ORAL at 12:54

## 2023-10-02 RX ADMIN — METFORMIN HYDROCHLORIDE 500 MILLIGRAM(S): 850 TABLET ORAL at 16:51

## 2023-10-02 RX ADMIN — SODIUM CHLORIDE 3 MILLILITER(S): 9 INJECTION INTRAMUSCULAR; INTRAVENOUS; SUBCUTANEOUS at 21:45

## 2023-10-02 RX ADMIN — Medication 15 MILLIGRAM(S): at 19:02

## 2023-10-02 RX ADMIN — SODIUM CHLORIDE 125 MILLILITER(S): 9 INJECTION, SOLUTION INTRAVENOUS at 15:58

## 2023-10-02 RX ADMIN — SODIUM CHLORIDE 125 MILLILITER(S): 9 INJECTION, SOLUTION INTRAVENOUS at 15:15

## 2023-10-02 RX ADMIN — Medication 15 MILLIGRAM(S): at 23:40

## 2023-10-02 RX ADMIN — SODIUM CHLORIDE 3 MILLILITER(S): 9 INJECTION INTRAMUSCULAR; INTRAVENOUS; SUBCUTANEOUS at 07:33

## 2023-10-02 NOTE — DISCHARGE NOTE PROVIDER - NSDCCPCAREPLAN_GEN_ALL_CORE_FT
PRINCIPAL DISCHARGE DIAGNOSIS  Diagnosis: Osteoarthritis of hip  Assessment and Plan of Treatment:

## 2023-10-02 NOTE — DISCHARGE NOTE PROVIDER - CARE PROVIDER_API CALL
Paddy Infante  Orthopaedic Surgery  [address], [address]  [place] [address]  Phone: [phone]  Fax: [phone]  Follow Up Time:

## 2023-10-02 NOTE — PHYSICAL THERAPY INITIAL EVALUATION ADULT - PERTINENT HX OF CURRENT PROBLEM, REHAB EVAL
Patient is a 67 y/o male admitted to [facility] s/p elective L AMAURY (anterior) on 10/2/2023.

## 2023-10-02 NOTE — OCCUPATIONAL THERAPY INITIAL EVALUATION ADULT - ADDITIONAL COMMENTS
Pre op assessment- pt lives with domestic partner 2nd floor of private house with 6 stair steps to enter with x2 handrails, wide apart to enter at front, then another 6 steps to 2nd floor c closer x2 rails. Bedroom is & bathroom at same floor. Bathroom is a step-in shower without grab rail but with a shower stool; a standard height toilet seat, with fixed shower head. Endorses will be supported by partner post-op.

## 2023-10-02 NOTE — ASU PREOP CHECKLIST - TYPE OF SOLUTION
Labor Progress Note    Cheryl Villeda   40w4d      Subjective:  Uterine contractions:yes  Pain: No    Objective:   Vitals:    06/26/23 2337 06/26/23 2354 06/27/23 0007 06/27/23 0022   BP: 136/70 136/72 134/64 118/61   Pulse: 85 90 86 75   Resp:       Temp:       TempSrc:       SpO2:       Weight:       Height:         Fetal heart variability: moderate  Fetal Heart Rate decelerations: none  Fetal Heart Rate accelerations: yes  Baseline FHR: 150 per minute  Uterine contractions: irregular, every 2-3 minutes  Cervical: 100% ;  Dilatation .10 ; Station negative0    Membranes ruptured: .yes    Meds:   Epidural : .yes  Pitocin: .yes    Labs:  Recent Results (from the past 24 hour(s))   Hold Blood Bank Specimen (Not Tested)    Collection Time: 06/26/23  3:55 AM   Result Value Ref Range    Holding Tube - Bb DONE    CBC with differential    Collection Time: 06/26/23  3:55 AM   Result Value Ref Range    WBC 11.8 (H) 4.8 - 10.8 K/uL    RBC 3.93 (L) 4.20 - 5.40 M/uL    Hemoglobin 11.4 (L) 12.0 - 16.0 g/dL    Hematocrit 34.7 (L) 37.0 - 47.0 %    MCV 88.3 81.4 - 97.8 fL    MCH 29.0 27.0 - 33.0 pg    MCHC 32.9 32.2 - 35.5 g/dL    RDW 45.8 35.9 - 50.0 fL    Platelet Count 269 164 - 446 K/uL    MPV 9.7 9.0 - 12.9 fL    Neutrophils-Polys 77.00 (H) 44.00 - 72.00 %    Lymphocytes 13.70 (L) 22.00 - 41.00 %    Monocytes 8.10 0.00 - 13.40 %    Eosinophils 0.30 0.00 - 6.90 %    Basophils 0.50 0.00 - 1.80 %    Immature Granulocytes 0.40 0.00 - 0.90 %    Nucleated RBC 0.00 0.00 - 0.20 /100 WBC    Neutrophils (Absolute) 9.08 (H) 1.82 - 7.42 K/uL    Lymphs (Absolute) 1.62 1.00 - 4.80 K/uL    Monos (Absolute) 0.95 (H) 0.00 - 0.85 K/uL    Eos (Absolute) 0.04 0.00 - 0.51 K/uL    Baso (Absolute) 0.06 0.00 - 0.12 K/uL    Immature Granulocytes (abs) 0.05 0.00 - 0.11 K/uL    NRBC (Absolute) 0.00 K/uL   T.PALLIDUM AB HOWARD (Syphilis)    Collection Time: 06/26/23  3:55 AM   Result Value Ref Range    Syphilis, Treponemal Qual Non-Reactive Non-Reactive        Assessment:   40w4d  Labor State: Arrest of progress - first stage labor.  Patient pushed intermittently for 3-1/2 hours.  There was no real descent of the fetal head  Offered primary  delivery, patient desires to have  versus continued pushing  Mild fever of 99.9 with fetal tachycardia  Ampicillin and gentamicin started with Tylenol    Discussed with the patient indications for  delivery. The patient voiced understanding of indications for  section at this time.    Discussed with the patient the risks of  delivery. The risks include infection, bleeding, scarring, damage to other organs in the area of operation. Specifically organs that can be damaged are bowel, bladder, ureters. I also discussed with the patient the risk of wound infection and wound breakdown. We discussed that these risks are greater in people that have diabetes or obesity. I also discussed the risk of emergency blood transfusion during procedure as well as emergency hysterectomy during procedure.      Patient had the opportunity to ask questions regarding procedures. All questions answered to the patient's satisfaction.  Proceed with  delivery        Caitie Hurt M.D.             marly quezada

## 2023-10-02 NOTE — PHYSICAL THERAPY INITIAL EVALUATION ADULT - RANGE OF MOTION EXAMINATION, REHAB EVAL
Except LLE limited due to precautions/bilateral upper extremity ROM was WFL (within functional limits)/bilateral lower extremity ROM was WFL (within functional limits)/deficits as listed below Except left hip limited due to pain and precautions/bilateral upper extremity ROM was WFL (within functional limits)/bilateral lower extremity ROM was WFL (within functional limits)/deficits as listed below

## 2023-10-02 NOTE — CONSULT NOTE ADULT - SUBJECTIVE AND OBJECTIVE BOX
RAMON ALAS JR is a 68y Male s/p LEFT ANTERIOR TOTAL HIP ARTHROPLASTY WITH IMAGE      w/ h/o HLD (hyperlipidemia)    HTN (hypertension)    DM (diabetes mellitus)      denies any chest pain shortness of breath palpitation dizziness lightheadedness nausea vomiting fever or chills    H/O colonoscopy    H/O knee surgery    Vocal cord polyp        SH: doesnot smoke or drink at this time    No Known Drug Allergies  Seafood (Other)  Seeds (Rash)    acetaminophen     Tablet .. 1000 milliGRAM(s) Oral every 8 hours PRN  ascorbic acid 500 milliGRAM(s) Oral two times a day  atorvastatin 80 milliGRAM(s) Oral at bedtime  ceFAZolin   IVPB 2000 milliGRAM(s) IV Intermittent every 8 hours  dextrose 5%. 1000 milliLiter(s) IV Continuous <Continuous>  dextrose 5%. 1000 milliLiter(s) IV Continuous <Continuous>  dextrose 50% Injectable 25 Gram(s) IV Push once  dextrose 50% Injectable 12.5 Gram(s) IV Push once  dextrose 50% Injectable 25 Gram(s) IV Push once  dextrose Oral Gel 15 Gram(s) Oral once PRN  gabapentin 100 milliGRAM(s) Oral three times a day  glucagon  Injectable 1 milliGRAM(s) IntraMuscular once  HYDROmorphone  Injectable 0.5 milliGRAM(s) IV Push every 3 hours PRN  influenza  Vaccine (HIGH DOSE) 0.7 milliLiter(s) IntraMuscular once  insulin lispro (ADMELOG) corrective regimen sliding scale   SubCutaneous at bedtime  insulin lispro (ADMELOG) corrective regimen sliding scale   SubCutaneous three times a day before meals  ketorolac   Injectable 15 milliGRAM(s) IV Push every 6 hours  lactated ringers. 1000 milliLiter(s) IV Continuous <Continuous>  magnesium hydroxide Suspension 30 milliLiter(s) Oral daily PRN  melatonin 3 milliGRAM(s) Oral at bedtime PRN  multivitamin 1 Tablet(s) Oral daily  ondansetron Injectable 4 milliGRAM(s) IV Push every 6 hours PRN  oxyCODONE    IR 10 milliGRAM(s) Oral every 3 hours PRN  oxyCODONE    IR 5 milliGRAM(s) Oral every 3 hours PRN  pantoprazole    Tablet 40 milliGRAM(s) Oral before breakfast  polyethylene glycol 3350 17 Gram(s) Oral at bedtime  senna 2 Tablet(s) Oral at bedtime  simvastatin 10 milliGRAM(s) Oral at bedtime  sodium chloride 0.9% lock flush 3 milliLiter(s) IV Push every 8 hours    T(C): 36.6 (10-02-23 @ 19:48), Max: 36.8 (10-02-23 @ 06:52)  HR: 91 (10-02-23 @ 19:48) (78 - 100)  BP: 107/68 (10-02-23 @ 19:48) (99/73 - 130/87)  RR: 16 (10-02-23 @ 19:48) (12 - 18)  SpO2: 97% (10-02-23 @ 19:48) (94% - 98%)  HEENT unremarkable  neck no JVD or bruit  heart normal S1 S2 RRR no gallops or rubs  chest clear to auscultation  abd sof nontender non distended +bs  ext no calf tenderness    A/P   DVT PX  pain control  bowel regimen   wound care as per ortho  GI PX  antiemetics prn  incentive spirometer

## 2023-10-02 NOTE — DISCHARGE NOTE PROVIDER - NSDCCPTREATMENT_GEN_ALL_CORE_FT
PRINCIPAL PROCEDURE  Procedure: Left total hip arthroplasty  Findings and Treatment: anterior approach

## 2023-10-02 NOTE — DISCHARGE NOTE PROVIDER - NSDCFUADDINST_GEN_ALL_CORE_FT
Hip replacement precautions:  Elevate the leg (while keeping hip precautions) as often as possible to help control swelling. Incentive spirometer 10X/hr.  Keep Prineo Dressing Clean, Dry and Intact. May shower with Prineo Dressing. Please do not scrub, soak, peel or pick at the prineo dressing. No creams, lotions, or oils over dressing. May shower and let water run over incision, no baths. Pat dry once out of shower. Dressing to be removed in office at follow up visit in 2 weeks. There are no staples or stitches that need to be removed.  If you notice any redness, irritation, or itching around the prineo dressing call the surgeon's office  [facility] ext [phone] at [place]

## 2023-10-02 NOTE — DISCHARGE NOTE PROVIDER - NSDCMRMEDTOKEN_GEN_ALL_CORE_FT
ezetimibe 10 mg oral tablet: 1 orally once a day  MetFORMIN (Eqv-Fortamet) 500 mg oral tablet, extended release: 1 orally once a day  rosuvastatin 40 mg oral tablet: 1 orally once a day   ascorbic acid 500 mg oral tablet: 1 tab(s) orally 2 times a day  Aspirin Enteric Coated 81 mg oral delayed release tablet: 1 tab(s) orally 2 times a day MDD: 2  CeleBREX 200 mg oral capsule: 1 cap(s) orally 2 times a day  ezetimibe 10 mg oral tablet: 1 orally once a day  gabapentin 100 mg oral capsule: 1 cap(s) orally every 8 hours MDD: 3 Tabs  MetFORMIN (Eqv-Fortamet) 500 mg oral tablet, extended release: 1 orally once a day  Multiple Vitamins oral tablet: 1 tab(s) orally once a day  Narcan 4 mg/0.1 mL nasal spray: 4 milligram(s) intranasally once MDD:0.2ML, Repeat as Needed, For Suspected Opiate Overdose  oxyCODONE 5 mg oral tablet: 1 tab(s) orally every 4 hours as needed for  pain 1 or 2 tabs every 4hrs as Needed for Pain MDD: 12 Tabs  rosuvastatin 40 mg oral tablet: 1 orally once a day  senna leaf extract oral tablet: 2 tab(s) orally once a day (at bedtime)

## 2023-10-02 NOTE — DISCHARGE NOTE PROVIDER - NSDCFUSCHEDAPPT_GEN_ALL_CORE_FT
[facility]  [id number]  Scheduled Appointment: 10/18/2023

## 2023-10-02 NOTE — PHYSICAL THERAPY INITIAL EVALUATION ADULT - GAIT TRAINING, PT EVAL
Pt will improve ambulation to ~300 feet Independently using rolling walker within 2 weeks. Pt will negotiate ~6 steps c L rail up c Supervision within 2 weeks..

## 2023-10-02 NOTE — PATIENT PROFILE ADULT - FALL HARM RISK - RISK INTERVENTIONS

## 2023-10-02 NOTE — DISCHARGE NOTE PROVIDER - HOSPITAL COURSE
68yMale with history of Left hip OA presenting for L AMAURY by Dr. Infante on 10/2/23. Risk and benefits of surgery were explained to the patient. The patient understood and agreed to proceed with surgery. Patient underwent the procedure with no intraoperative complications. Pt was brought in stable condition to the PACU. Once stable in PACU, pt was brought to the floor. During hospital stay pt was followed by Medicine, physical therapy, Home Care during this admission. Pt is stable for discharge 68yMale with history of Left hip OA presenting for L AMAURY by Dr. Infante on 10/2/23. Risk and benefits of surgery were explained to the patient. The patient understood and agreed to proceed with surgery. Patient underwent the procedure with no intraoperative complications. Pt was brought in stable condition to the PACU. Once stable in PACU, pt was brought to the floor. During hospital stay pt was followed by Medicine, physical therapy, Home Care during this admission. Pt is stable for discharge Home with Home Care Services and PT

## 2023-10-02 NOTE — PHYSICAL THERAPY INITIAL EVALUATION ADULT - GENERAL OBSERVATIONS, REHAB EVAL
Chart (EMR) reviewed. Received supine c HOB elevated, NAD. Alert. Ox4. +heplock, +B/L SCD's. Able to follow multistep commands/directions. Chart (EMR) reviewed. Received supine c HOB elevated, NAD. Alert. Ox4. +heplock, +B/L SCD's donned, +dressing to left hip intact. Able to follow multistep commands/directions.

## 2023-10-02 NOTE — PROGRESS NOTE ADULT - ASSESSMENT
DOS: 10/2/23    ATTENDING SURGEON: P[facility] Infante MD    ASSISTANT: CHRISTINA Chaudhry    ANESTHESIA: Spinal    PREOPERATIVE DIAGNOSIS: Left Hip Osteoarthritis M16.11    PREOPERATIVE DIAGNOSIS: Left Hip Osteoarthritis M16.11    OPERATION: Left Total hip arthoplasty    INSTRUMENTATION USED:   G7 OsseoTI acetabular cup size 54  36 mm ID Highly cross linked poly  Avenir Complete coxa vara size 3  Biolox Delta size 36, + 3.5    INDICATION FOR THE PROCEDURE: The patient presented with severe osteoarthritis of the hip and pain with ambulation during daily activities. Conservative management has failed to alleviate the pain. The patient was thus indicated for the above mentioned procedure.    All risks and benefits of the procedure were explained to the patient. The patient fully understood the procedure and freely consented.    PROCEDURE IN DETAIL:  The patient was taken to the operating room and after anesthetic induction, was placed onto the [place] surgical table in a supine position. The skin and operative site were prepped and draped in the usual sterile fashion. A proper timeout was performed prior to the incision    An incision was made over the hip just distal and lateral to the ASIS and superficial dissection was carried down to the TFL fascia. The fascia was incised and elevated off the TFL and the plane between the TFL and sartorius was identified. Crossing vessels of the circumflex artery were then ligated. Head of the rectus was reflected off the anterior capsule. The femoral neck and the overlying capsule was then identified.    The appropriate femoral neck cut was made according preoperative plan. The head was subsequently removed and the acetabulum exposed. A tissue protector was inserted to protect the TFL and skin. The labrum and ligamentum was sharply excised and the transverse acetabular ligament identified. The acetabulum reamed sequentially until the appropriate size was reached and matched the preoperative template. The appropriate acetabular cup and polyethylene liner were then impacted in place.     Attention was then directed to the femur. At 90 degrees of ER, release were done of the superolateral capsule and pubofemoral ligament to obtain adequate excursion of the femur. The leg was now externally rotated, extended, and elevated using the [place] table. The conjoint tendon was released. Proper lateralization of the femoral canal was performed and trial broaching was then performed until the appropriate size was reached. A trial neck was then placed.     The leg was reduced and leg lengths were checked radiographically and stability was checked at 90 degrees of ER and 30 degrees of extension. Once leg length and stability were deemed acceptable the trial femur was removed and the wound was copiously irrigated with betadine. The final implant was placed and stability was once again tested and found to be satisfactory. The wound was irrigated. The fascia was closed with #1 stratafix and subcutaneus layer was closed with 2-0 vicryl. The skin was closed with 4-0 barbed suture and dermabond. A sterile occlusive dressing was placed.    The patient was taken to the recovery room in stable condition. There were no complications during the procedure.  The assistance of a skilled physician assistant was required for the completion of the surgery.

## 2023-10-02 NOTE — PHYSICAL THERAPY INITIAL EVALUATION ADULT - ADDITIONAL COMMENTS
Information confirmed with patient, as per preop assessment: Patient lives with domestic partner 2nd floor of private house with 6 stair steps to enter with x2 handrails, wide apart to enter at front, then another 6 steps to 2nd floor c closer x2 rails. Bedroom is & bathroom at same floor. Bathroom is a step-in shower without grab rail but with a shower stool; a standard height toilet seat, with fixed shower head. Endorses will be supported by partner post-op. Patient was independent with mobility. Patient is (R)-handed and drives; wears progressive glasses. Information confirmed with patient, as per preop assessment: Patient lives with domestic partner 2nd floor of private house with 6 stair steps to enter with x2 handrails, wide apart to enter at front, then another 6 steps to 2nd floor c closer x2 rails. Bedroom is & bathroom at same floor. Bathroom is a step-in shower without grab rail but with a shower stool; a standard height toilet seat, with fixed shower head. Endorses will be supported by partner post-op. Patient was independent with mobility. Patient is (R)-handed and drives; wears progressive glasses. Pt stated has own rolling walker, cane and commode.

## 2023-10-02 NOTE — DISCHARGE NOTE PROVIDER - NSDCFUADDAPPT_GEN_ALL_CORE_FT
Follow up with your surgeon in two weeks. Call for appointment.  If you need more pain medication, call your surgeon's office. For medication refills or authorizations, please call [phone] xt [phone]  We recommend that you call and schedule a follow up appointment within 2-4 weeks with your primary care physician for repeat blood work (CBC and BMP) for post hospital discharge follow-up care.  Call your surgeon if you have increased redness/pain/drainage or fever. Return to ER for shortness of breath/calf tenderness.

## 2023-10-02 NOTE — PHYSICAL THERAPY INITIAL EVALUATION ADULT - IMPAIRMENTS FOUND, PT EVAL
aerobic capacity/endurance/gait, locomotion, and balance/gross motor/muscle strength/posture/ROM aerobic capacity/endurance/gait, locomotion, and balance/gross motor/muscle strength/neuromotor development and sensory integration/posture/ROM

## 2023-10-03 ENCOUNTER — TRANSCRIPTION ENCOUNTER (OUTPATIENT)
Age: 68
End: 2023-10-03

## 2023-10-03 VITALS — SYSTOLIC BLOOD PRESSURE: 107 MMHG | OXYGEN SATURATION: 96 % | DIASTOLIC BLOOD PRESSURE: 69 MMHG | HEART RATE: 82 BPM

## 2023-10-03 LAB
ANION GAP SERPL CALC-SCNC: 4 MMOL/L — LOW (ref 5–17)
BUN SERPL-MCNC: 16 MG/DL — SIGNIFICANT CHANGE UP (ref 7–23)
CALCIUM SERPL-MCNC: 8.3 MG/DL — LOW (ref 8.5–10.1)
CHLORIDE SERPL-SCNC: 108 MMOL/L — SIGNIFICANT CHANGE UP (ref 96–108)
CO2 SERPL-SCNC: 28 MMOL/L — SIGNIFICANT CHANGE UP (ref 22–31)
CREAT SERPL-MCNC: 1.01 MG/DL — SIGNIFICANT CHANGE UP (ref 0.5–1.3)
EGFR: 82 ML/MIN/1.73M2 — SIGNIFICANT CHANGE UP
GLUCOSE BLDC GLUCOMTR-MCNC: 130 MG/DL — HIGH (ref 70–99)
GLUCOSE BLDC GLUCOMTR-MCNC: 150 MG/DL — HIGH (ref 70–99)
GLUCOSE SERPL-MCNC: 140 MG/DL — HIGH (ref 70–99)
HCT VFR BLD CALC: 34.6 % — LOW (ref 39–50)
HGB BLD-MCNC: 11.8 G/DL — LOW (ref 13–17)
MCHC RBC-ENTMCNC: 31.3 PG — SIGNIFICANT CHANGE UP (ref 27–34)
MCHC RBC-ENTMCNC: 34.1 G/DL — SIGNIFICANT CHANGE UP (ref 32–36)
MCV RBC AUTO: 91.8 FL — SIGNIFICANT CHANGE UP (ref 80–100)
NRBC # BLD: 0 /100 WBCS — SIGNIFICANT CHANGE UP (ref 0–0)
PLATELET # BLD AUTO: 181 K/UL — SIGNIFICANT CHANGE UP (ref 150–400)
POTASSIUM SERPL-MCNC: 4.7 MMOL/L — SIGNIFICANT CHANGE UP (ref 3.5–5.3)
POTASSIUM SERPL-SCNC: 4.7 MMOL/L — SIGNIFICANT CHANGE UP (ref 3.5–5.3)
RBC # BLD: 3.77 M/UL — LOW (ref 4.2–5.8)
RBC # FLD: 12.8 % — SIGNIFICANT CHANGE UP (ref 10.3–14.5)
SODIUM SERPL-SCNC: 140 MMOL/L — SIGNIFICANT CHANGE UP (ref 135–145)
WBC # BLD: 14.45 K/UL — HIGH (ref 3.8–10.5)
WBC # FLD AUTO: 14.45 K/UL — HIGH (ref 3.8–10.5)

## 2023-10-03 RX ORDER — GABAPENTIN 400 MG/1
1 CAPSULE ORAL
Qty: 42 | Refills: 0
Start: 2023-10-03 | End: 2023-10-16

## 2023-10-03 RX ORDER — SENNA PLUS 8.6 MG/1
2 TABLET ORAL
Qty: 0 | Refills: 0 | DISCHARGE
Start: 2023-10-03

## 2023-10-03 RX ORDER — NALOXONE HYDROCHLORIDE 4 MG/.1ML
4 SPRAY NASAL
Qty: 1 | Refills: 0
Start: 2023-10-03 | End: 2023-10-03

## 2023-10-03 RX ORDER — OXYCODONE HYDROCHLORIDE 5 MG/1
1 TABLET ORAL
Qty: 30 | Refills: 0
Start: 2023-10-03

## 2023-10-03 RX ORDER — ASPIRIN/CALCIUM CARB/MAGNESIUM 324 MG
1 TABLET ORAL
Qty: 60 | Refills: 0
Start: 2023-10-03 | End: 2023-11-01

## 2023-10-03 RX ORDER — ASCORBIC ACID 60 MG
1 TABLET,CHEWABLE ORAL
Qty: 0 | Refills: 0 | DISCHARGE
Start: 2023-10-03

## 2023-10-03 RX ORDER — CELECOXIB 200 MG/1
1 CAPSULE ORAL
Qty: 60 | Refills: 0
Start: 2023-10-03 | End: 2023-11-01

## 2023-10-03 RX ADMIN — OXYCODONE HYDROCHLORIDE 5 MILLIGRAM(S): 5 TABLET ORAL at 10:30

## 2023-10-03 RX ADMIN — METFORMIN HYDROCHLORIDE 500 MILLIGRAM(S): 850 TABLET ORAL at 12:48

## 2023-10-03 RX ADMIN — OXYCODONE HYDROCHLORIDE 5 MILLIGRAM(S): 5 TABLET ORAL at 13:47

## 2023-10-03 RX ADMIN — SODIUM CHLORIDE 3 MILLILITER(S): 9 INJECTION INTRAMUSCULAR; INTRAVENOUS; SUBCUTANEOUS at 12:38

## 2023-10-03 RX ADMIN — CELECOXIB 200 MILLIGRAM(S): 200 CAPSULE ORAL at 06:35

## 2023-10-03 RX ADMIN — OXYCODONE HYDROCHLORIDE 5 MILLIGRAM(S): 5 TABLET ORAL at 09:30

## 2023-10-03 RX ADMIN — CELECOXIB 200 MILLIGRAM(S): 200 CAPSULE ORAL at 06:18

## 2023-10-03 RX ADMIN — GABAPENTIN 100 MILLIGRAM(S): 400 CAPSULE ORAL at 06:02

## 2023-10-03 RX ADMIN — Medication 500 MILLIGRAM(S): at 06:02

## 2023-10-03 RX ADMIN — Medication 15 MILLIGRAM(S): at 06:19

## 2023-10-03 RX ADMIN — Medication 101.6 MILLIGRAM(S): at 05:59

## 2023-10-03 RX ADMIN — PANTOPRAZOLE SODIUM 40 MILLIGRAM(S): 20 TABLET, DELAYED RELEASE ORAL at 06:01

## 2023-10-03 RX ADMIN — OXYCODONE HYDROCHLORIDE 5 MILLIGRAM(S): 5 TABLET ORAL at 14:30

## 2023-10-03 RX ADMIN — GABAPENTIN 100 MILLIGRAM(S): 400 CAPSULE ORAL at 13:20

## 2023-10-03 RX ADMIN — Medication 81 MILLIGRAM(S): at 06:01

## 2023-10-03 RX ADMIN — Medication 1 TABLET(S): at 12:48

## 2023-10-03 RX ADMIN — SODIUM CHLORIDE 3 MILLILITER(S): 9 INJECTION INTRAMUSCULAR; INTRAVENOUS; SUBCUTANEOUS at 05:54

## 2023-10-03 RX ADMIN — Medication 15 MILLIGRAM(S): at 14:18

## 2023-10-03 RX ADMIN — Medication 15 MILLIGRAM(S): at 06:01

## 2023-10-03 RX ADMIN — Medication 15 MILLIGRAM(S): at 13:18

## 2023-10-03 NOTE — DISCHARGE NOTE NURSING/CASE MANAGEMENT/SOCIAL WORK - NSDCPEFALRISK_GEN_ALL_CORE
No For information on Fall & Injury Prevention, visit: [email or web address] OR  [email or web address] OR  https://www.cdc.gov/steadi/patient.html

## 2023-10-03 NOTE — PROGRESS NOTE ADULT - SUBJECTIVE AND OBJECTIVE BOX
POD#1 S/P Left Anterior AMAURY   68yMale Patient seen and examined, Pain controlled  Patient Denies SOB, CP, N/V/D       PE: Left Hip/LE: Dressing C/D/I, Sensation/motor intact, DP 2+, FROM ankle/toes   B/L LE: Skin intact. +ROM hip/knee/ankle/toes. Ankle Dorsi/plantarflexion: 5/5. Calf: soft, compressible and nontender. DP/PT 2+ NVI                        11.8   14.45 )-----------( 181      ( 03 Oct 2023 06:20 )             34.6       10-03    140  |  108  |  16  ----------------------------<  140<H>  4.7   |  28  |  1.01    Ca    8.3<L>      03 Oct 2023 06:20          A: As above   P: Pain Control       DVT Prophylaxis      Incentive spirometry      Anterior Total hip precautions Reviewed       PT WBAT LLE       Isometric exercises      Discharge Planning      All the above discussed and understood by pt       Ortho to F/U 
RAMON ALAS JR is a 68y Male s/p LEFT ANTERIOR TOTAL HIP ARTHROPLASTY WITH IMAGE        denies any chest pain shortness of breath palpitation dizziness lightheadedness nausea vomiting fever or chills    T(C): 36.9 (10-03-23 @ 09:27), Max: 36.9 (10-03-23 @ 09:27)  HR: 75 (10-03-23 @ 09:27) (70 - 100)  BP: 113/73 (10-03-23 @ 09:27) (99/73 - 113/73)  RR: 18 (10-03-23 @ 09:27) (12 - 18)  SpO2: 96% (10-03-23 @ 09:27) (94% - 98%)  no jvd/bruit  s1 s2 rrr  cta  s/nt/nd  no calf tend                        11.8   14.45 )-----------( 181      ( 03 Oct 2023 06:20 )             34.6   10-03    140  |  108  |  16  ----------------------------<  140<H>  4.7   |  28  |  1.01    Ca    8.3<L>      03 Oct 2023 06:20        cont dvt px  pain control  bowel regimen  antiemetics  incentive spirometer
Patient is s/p L Anterior AMAURY POD#0  Pt tolerated procedure well without any intra-op complications.   Pt doing well at this time. Pain is controlled.   Denies CP/SOB/Dizziness/N/V/D/HA.     Vital Signs Last 24 Hrs  T(C): 36.7 (02 Oct 2023 12:17), Max: 36.8 (02 Oct 2023 06:52)  T(F): 98 (02 Oct 2023 12:17), Max: 98.3 (02 Oct 2023 06:52)  HR: 78 (02 Oct 2023 12:17) (78 - 87)  BP: 109/72 (02 Oct 2023 12:17) (99/73 - 130/87)  BP(mean): --  RR: 18 (02 Oct 2023 12:17) (12 - 18)  SpO2: 98% (02 Oct 2023 12:17) (97% - 98%)    Parameters below as of 02 Oct 2023 12:17  Patient On (Oxygen Delivery Method): room air        PE:  General: A&Ox3 NAD  LLE: Dressing C/D/I. Motor intact + EHL/FHL/TA/GS. Sensation is grossly intact. Extremity warm. Compartments soft, compressible, no calf tenderness. DP 2+   RLE: Motor intact + EHL/FHL/TA/GS. Sensation is grossly intact. Extremity warm. Compartments soft, compressible, no calf tenderness. DP 2+     Labs:                          13.5   9.79  )-----------( 184      ( 02 Oct 2023 11:00 )             40.8       10-02    141  |  110<H>  |  13  ----------------------------<  161<H>  4.8   |  26  |  1.13    Ca    8.1<L>      02 Oct 2023 11:00        A/P: Patient is a 68y y/o Male s/p L Anterior AMAURY, POD #0  -wound care, isometric exercises, GI motility, new medications, hospital course reviewed with pt  -Pain control/analgesia  -Inc spirometry reviewed and counseled  -DVT ppx with venodynes and ASA 81 BID  -F/U AM Labs  -PT/OT/WBAT, Anterior hip precautions,   -Antibiotic post op  -Medical consult  -Discharge planning

## 2023-10-03 NOTE — DISCHARGE NOTE NURSING/CASE MANAGEMENT/SOCIAL WORK - NSDCFUADDAPPT_GEN_ALL_CORE_FT
Follow up with your surgeon in two weeks. Call for appointment.  If you need more pain medication, call your surgeon's office. For medication refills or authorizations, please call [phone] xt [phone]  We recommend that you call and schedule a follow up appointment within 2-4 weeks with your primary care physician for repeat blood work (CBC and BMP) for post hospital discharge follow-up care.  Call your surgeon if you have increased redness/pain/drainage or fever. Return to ER for shortness of breath/calf tenderness.

## 2023-10-05 ENCOUNTER — TRANSCRIPTION ENCOUNTER (OUTPATIENT)
Age: 68
End: 2023-10-05

## 2023-10-16 DIAGNOSIS — E11.9 TYPE 2 DIABETES MELLITUS WITHOUT COMPLICATIONS: ICD-10-CM

## 2023-10-16 DIAGNOSIS — M16.12 UNILATERAL PRIMARY OSTEOARTHRITIS, LEFT HIP: ICD-10-CM

## 2023-10-16 DIAGNOSIS — I10 ESSENTIAL (PRIMARY) HYPERTENSION: ICD-10-CM

## 2023-10-16 DIAGNOSIS — Z91.018 ALLERGY TO OTHER FOODS: ICD-10-CM

## 2023-10-16 DIAGNOSIS — Z79.84 LONG TERM (CURRENT) USE OF ORAL HYPOGLYCEMIC DRUGS: ICD-10-CM

## 2023-10-16 DIAGNOSIS — E78.5 HYPERLIPIDEMIA, UNSPECIFIED: ICD-10-CM

## 2023-10-16 DIAGNOSIS — Z91.013 ALLERGY TO SEAFOOD: ICD-10-CM

## 2023-10-17 ENCOUNTER — APPOINTMENT (OUTPATIENT)
Dept: ORTHOPEDIC SURGERY | Facility: CLINIC | Age: 68
End: 2023-10-17
Payer: MEDICARE

## 2023-10-17 VITALS — BODY MASS INDEX: 26.92 KG/M2 | HEIGHT: 70 IN | WEIGHT: 188 LBS

## 2023-10-17 PROBLEM — E78.5 HYPERLIPIDEMIA, UNSPECIFIED: Chronic | Status: ACTIVE | Noted: 2023-09-12

## 2023-10-17 PROBLEM — I10 ESSENTIAL (PRIMARY) HYPERTENSION: Chronic | Status: ACTIVE | Noted: 2023-09-12

## 2023-10-17 PROBLEM — E11.9 TYPE 2 DIABETES MELLITUS WITHOUT COMPLICATIONS: Chronic | Status: ACTIVE | Noted: 2023-09-12

## 2023-10-17 PROCEDURE — 99024 POSTOP FOLLOW-UP VISIT: CPT

## 2023-10-17 PROCEDURE — 73502 X-RAY EXAM HIP UNI 2-3 VIEWS: CPT

## 2023-10-18 ENCOUNTER — TRANSCRIPTION ENCOUNTER (OUTPATIENT)
Age: 68
End: 2023-10-18

## 2023-10-19 RX ORDER — AMOXICILLIN 500 MG/1
500 TABLET, FILM COATED ORAL
Qty: 4 | Refills: 0 | Status: ACTIVE | COMMUNITY
Start: 2023-10-19 | End: 1900-01-01

## 2023-11-14 ENCOUNTER — APPOINTMENT (OUTPATIENT)
Dept: ORTHOPEDIC SURGERY | Facility: CLINIC | Age: 68
End: 2023-11-14
Payer: MEDICARE

## 2023-11-14 VITALS — HEIGHT: 70 IN | BODY MASS INDEX: 26.92 KG/M2 | WEIGHT: 188 LBS

## 2023-11-14 PROCEDURE — 99024 POSTOP FOLLOW-UP VISIT: CPT

## 2023-11-14 RX ORDER — METHYLPREDNISOLONE 4 MG/1
4 TABLET ORAL
Qty: 1 | Refills: 1 | Status: ACTIVE | COMMUNITY
Start: 2023-11-14 | End: 1900-01-01

## 2023-11-14 RX ORDER — CELECOXIB 200 MG/1
200 CAPSULE ORAL TWICE DAILY
Qty: 60 | Refills: 0 | Status: ACTIVE | COMMUNITY
Start: 2023-11-14 | End: 1900-01-01

## 2023-11-30 ENCOUNTER — APPOINTMENT (OUTPATIENT)
Dept: MRI IMAGING | Facility: CLINIC | Age: 68
End: 2023-11-30
Payer: MEDICARE

## 2023-11-30 ENCOUNTER — APPOINTMENT (OUTPATIENT)
Dept: ORTHOPEDIC SURGERY | Facility: CLINIC | Age: 68
End: 2023-11-30
Payer: MEDICARE

## 2023-11-30 VITALS — HEIGHT: 70 IN | BODY MASS INDEX: 26.92 KG/M2 | WEIGHT: 188 LBS

## 2023-11-30 PROCEDURE — 99024 POSTOP FOLLOW-UP VISIT: CPT

## 2023-11-30 PROCEDURE — 73502 X-RAY EXAM HIP UNI 2-3 VIEWS: CPT

## 2023-11-30 PROCEDURE — 73721 MRI JNT OF LWR EXTRE W/O DYE: CPT | Mod: LT,MH

## 2023-12-01 ENCOUNTER — APPOINTMENT (OUTPATIENT)
Dept: ORTHOPEDIC SURGERY | Facility: CLINIC | Age: 68
End: 2023-12-01

## 2023-12-04 ENCOUNTER — NON-APPOINTMENT (OUTPATIENT)
Age: 68
End: 2023-12-04

## 2023-12-05 ENCOUNTER — APPOINTMENT (OUTPATIENT)
Dept: ORTHOPEDIC SURGERY | Facility: CLINIC | Age: 68
End: 2023-12-05
Payer: MEDICARE

## 2023-12-05 VITALS — BODY MASS INDEX: 26.92 KG/M2 | WEIGHT: 188 LBS | HEIGHT: 70 IN

## 2023-12-05 PROCEDURE — 20610 DRAIN/INJ JOINT/BURSA W/O US: CPT | Mod: 79,LT

## 2023-12-14 ENCOUNTER — APPOINTMENT (OUTPATIENT)
Dept: MRI IMAGING | Facility: CLINIC | Age: 68
End: 2023-12-14
Payer: MEDICARE

## 2023-12-14 PROCEDURE — 72148 MRI LUMBAR SPINE W/O DYE: CPT | Mod: MH

## 2023-12-19 ENCOUNTER — APPOINTMENT (OUTPATIENT)
Dept: ORTHOPEDIC SURGERY | Facility: CLINIC | Age: 68
End: 2023-12-19
Payer: MEDICARE

## 2023-12-19 VITALS — BODY MASS INDEX: 26.92 KG/M2 | WEIGHT: 188 LBS | HEIGHT: 70 IN

## 2023-12-19 PROCEDURE — 99024 POSTOP FOLLOW-UP VISIT: CPT

## 2023-12-19 NOTE — PHYSICAL EXAM
[de-identified] : POSTOP LEFT HIP EXAM Edema: mild well healing surgical incision without surrounding erythema/drainage  ROM 0-90 degrees of flexion No pain with IR/ER ROM  Neurovascular exam Motor function 5/5 distal lower extremity Sensation to light touch: intact Distal pulses: 2+  XR of the L hip today demonstrate AMAURY in place w/o signs of interval changes from postoperative XR

## 2023-12-19 NOTE — IMAGING
[de-identified] : Constitutional: well developed and well nourished, able to communicate Cardiovascular: Peripheral vascular exam is grossly normal Neurologic: Alert and oriented, no acute distress. Skin: normal skin with no ulcers, rashes, or lesions Pulmonary: No respiratory distress, breathing comfortably on room air Lymphatics: No obvious lymphadenopathy or lymphedema in areas examined  LUMBAR EXAM Alignment: normal Scoliosis: none Spinous process: no tenderness Thoracic paraspinal tenderness: no tenderness Lumbar Paraspinal tenderness: POS tenderness  RANGE OF MOTION full with pain  MOTOR EXAM Hip Flexion: 5 /5 Knee Extension: 5 /5 Knee Flexion: 5 /5 Ankle Dorsiflexion: 5 /5 Ankle plantar flexion: 5 /5 Toe Extension: 5 /5  TTP lower lumabr spine Straight leg sign: negative Sensation intact L2-S1 nerve root distribution Toes warm and well perfused  [id number] Xrays of the lumbar spine and pelvis were taken demonstrating tonnis grade 3 b/l hips and L1/2 DDD with osteophytes and L5/S1 DDD

## 2023-12-19 NOTE — PROCEDURE
[FreeTextEntry3] : The risks, benefits and alternatives to the injection were discussed with the patient. The decision was made to proceed with the injection to reduce inflammation within the area. Verbal consent was obtained for the procedure. The left GT bursa was cleaned with alcohol and ethyl chloride was sprayed topically. 1cc of 40mg Kenalog and 4cc of 1% lidocaine was injected. The patient tolerated the procedure well and was instructed to ice the affected joint as need+-ed later today. Activities can be performed as tolerated

## 2023-12-19 NOTE — ASSESSMENT
[FreeTextEntry1] : 66 year M with left hip OA and lumbar strain - physical therapy, NSAIDs, MDP for [place] - Return in 6 weeks for follow up  11/22/2022 continues to have hip pain with likley overlying lumbar radic. - did not take MDP and did some PT - will peform dx and therapeutic hip injection under guidance.  1/3/23: Good relieve with IA CSI. Follow up PRN. Discussed possibility of eventual AMAURY.  03/07/2023 pain is back after 4 months from injection. Follow up Dr. Evans for injection CSI  8/15/23: all preop questions and concerns answered  9/12/23: During this visit and in depth review of preoperative and postoperative expectations were reviewed. the images were again discussed and online graphics were utilized to demonstrate the surgery.  An informational packet with postoperative medications and instructions was provided. The pateints medical history and medications were reviewed. the appropriate medications for the patients to take were indicated. Ample time was allowed for all questions to be answered. At the end of the visit, all questions has been addressed thoroughly.   10/17/23: 1st post op. Transition to outpatient PT. Continue ASA X 2 more weeks spoke about abx dental ppx Follow up at 6 week adams  11/14/2023 MDP and celebrex GT bursitis  continue PT  11/30/2023 MRI of the left hip with MARS protocol to r/o nondisplaced fracture. fu after completion  12/05/2023 MRI of left hip w/o signs of fx GT bursal injection today  12/19/2023 MRI of the lumbar spine demonstrated impingment left >R continue PT for lower back Fu with PM ASAP for RADHA consideration

## 2023-12-19 NOTE — HISTORY OF PRESENT ILLNESS
[Gradual] : gradual [3] : 3 [0] : 0 [Dull/Aching] : dull/aching [Occasional] : occasional [Walking] : walking [Retired] : Work status: retired [de-identified] : This is Mr. RAMON ALAS  a 66 year male who comes in today complaining of lower back and left hip pain for months with no injury.  Pain on the lateral aspect of the hip and lower back. Denies numbness/tingling. +heat, +Aleve.  11/22/2022 Mr. RAMON HINSON  here for eval of the left hip. Patient stated they feel worse  since their last visit. Patient mentioned having pain in the leg at the arch of the foot/calf/ thigh.   01/03/2023 Mr. RAMON HINSON  here for eval of the left hip. Patient stated his hip feels very good. States pain has mostly resolved.  No further pain down the leg.    03/07/2023 follow up left hip/ States he is feeling worse since last visit. it appears injection is wearing off.  08/15/2023 follow up left hip. States CSI helped temporarily. Did not last as long as the previous one.   09/12/2023 RAMON is here today for left hip follow up. pt states no changes in symptoms since last visit.  No significant pain. Ambulating with the cane.   11/30/23: no fevers or chills. pain in buttocks and hip. pain with ambulation. No numbness/tinglin. No relief with celebrex  12/05/23 here for a follow up to review mri results on the left hip, no changes since last visit  12/19/2023 Mr. RAMON ALAS JR is a 68 year male that comes in today for follow up left hip . pain is mostly in the lumbar spine and radiating to legs [] : no [FreeTextEntry1] : left hip  [FreeTextEntry9] : hip injection  [de-identified] : mri done at [facility] [de-identified] : nothing

## 2023-12-21 ENCOUNTER — APPOINTMENT (OUTPATIENT)
Dept: PAIN MANAGEMENT | Facility: CLINIC | Age: 68
End: 2023-12-21
Payer: MEDICARE

## 2023-12-21 VITALS — HEIGHT: 70 IN | BODY MASS INDEX: 26.92 KG/M2 | WEIGHT: 188 LBS

## 2023-12-21 PROCEDURE — 99214 OFFICE O/P EST MOD 30 MIN: CPT

## 2023-12-21 NOTE — DISCUSSION/SUMMARY
[de-identified] : After discussing various treatment options with the patient including but not limited to oral medications, physical therapy, exercise modalities as well as interventional spinal injections, we have decided with the following plan:  - Continue Home exercises, stretching, activity modification, physical therapy, and conservative care. - MRI report and/or images was reviewed and discussed with the patient. - Recommend Left L4-5, L5-S1 Transforaminal Epidural Steroid Injection under fluoroscopic guidance with image. - The risks, benefits and alternatives of the proposed procedure were explained in detail with the patient. The risks outlined include but are not limited to infection, bleeding, post-dural puncture headache, nerve injury, a temporary increase in pain, failure to resolve symptoms, allergic reaction, symptom recurrence, and possible elevation of blood sugar in diabetics. All questions were answered to patient's apparent satisfaction and he/she verbalized an understanding. - Patient is presenting with acute/sub-acute radicular pain with impairment in ADLs and functionality.  The pain has not responded to conservative care including NSAID therapy and/or physical therapy.  There is no bleeding tendency, unstable medical condition, or systemic infection. - Follow up in 1-2 weeks post injection for re-evaluation.

## 2023-12-21 NOTE — PHYSICAL EXAM
[de-identified] : Constitutional; Appears well, no apparent distress Ability to communicate: Normal  Respiratory: non-labored breathing Skin: No rash noted Head: Normocephalic, atraumatic Neck: no visible thyroid enlargement Eyes: Extraocular movements intact Neurologic: Alert and oriented x3 Psychiatric: normal mood, affect and behavior [] : non-antalgic

## 2023-12-21 NOTE — HISTORY OF PRESENT ILLNESS
[Lower back] : lower back [8] : 8 [2] : 2 [Dull/Aching] : dull/aching [Sharp] : sharp [Shooting] : shooting [Household chores] : household chores [Sleep] : sleep [Nothing helps with pain getting better] : Nothing helps with pain getting better [Standing] : standing [Walking] : walking [FreeTextEntry1] : Initial HPI 12/21/2023: S/p left AMAURY with Dr. Infante on 10/2/23 and was feeling great until 3 weeks after the surgery when started having left lower back pain radiating to the left lateral hip and anterior shin described as a sharp shooting burning pain worse with standing for long periods. Saw Dr. Infante who recommended pain mgt for possible RADHA.   MRI Lumbar Spine 12/14/23 independently reviewed:  Conservative Care: has been doing PT  Pain Medications: tylenol PRN.  Past Injections: none Spine surgery: none  Blood thinners: none  [] : no [FreeTextEntry7] : lt leg  [de-identified] : l mri

## 2023-12-29 NOTE — PHYSICAL THERAPY INITIAL EVALUATION ADULT - NS ASR WT BEARING DETAIL LLE
HCC coding opportunities       Chart reviewed, no opportunity found: CHART REVIEWED, NO OPPORTUNITY FOUND        Patients Insurance     Medicare Insurance: Medicare          
weight-bearing as tolerated

## 2024-01-04 ENCOUNTER — APPOINTMENT (OUTPATIENT)
Dept: PAIN MANAGEMENT | Facility: CLINIC | Age: 69
End: 2024-01-04
Payer: MEDICARE

## 2024-01-04 PROCEDURE — 64483 NJX AA&/STRD TFRM EPI L/S 1: CPT | Mod: LT

## 2024-01-04 PROCEDURE — 64484 NJX AA&/STRD TFRM EPI L/S EA: CPT | Mod: LT

## 2024-01-04 PROCEDURE — J3490M: CUSTOM | Mod: NC

## 2024-01-04 NOTE — PROCEDURE
[FreeTextEntry3] : Date of Service: 01/04/2024   Account: [id number]  Patient: RAMON ALAS JR   YOB: 1955  Age: 68 year   Surgeon:                                                        Joe Pittman D.O.  Assistant:                                                        None  Pre-Operative Diagnosis:                            Lumbosacral radiculitis (M54.17)  Post-Operative Diagnosis:                          Same  Procedure:                                                     Left L4-5, L5-S1, transforaminal epidural steroid injection under fluoroscopic guidance.  Anesthesia:                                                    Local with MAC  This procedure was carried out using fluoroscopic guidance.  The risks and benefits of the procedure were discussed extensively with the patient.  The consent of the patient was obtained and the following procedure was performed. The patient was placed in the prone position on the fluoroscopic table and the lumbar area was prepped and draped in a sterile fashion. A timeout was performed with all essential staff present and the site and side were verified.  The Left L4-5, L5-S1 neural foramen were identified on left oblique "concetta dog" anatomical view at the 6 o'clock position using fluoroscopic guidance, and the area was marked. The overlying skin and subcutaneous structures were anesthetized using sterile technique with 1% Lidocaine.  A 22-gauge spinal needle was directed toward the inferior (6 o'clock) position of the pedicle, which formed the roof of the identified foramen.  Once in the epidural space, after negative aspiration for heme and CSF, 1cc of Omnipaque contrast was injected under live fluoroscopy to confirm epidural location and assess filling defects and rule out intravascular needle placement.   The following contrast flow and epidurogram was observed: no intravascular or intrathecal flow pattern was noted.  No blood or CSF was aspirated. Contrast spread appeared to outline the left L4 and L5 nerve roots and spread medially into the epidural space.    After this, 3 ml of a total mixture of 12 mg betamethasone, 2 ml of preservative free normal saline, and 2 ml of 0.25% bupivacaine was administered without any resistance in the epidural space at each of the two levels.   The needle was subsequently removed.  Vital signs remained normal.  Pulse oximeter was used throughout the procedure and the patient's pulse and oxygen saturation remained within normal limits.  The patient tolerated the procedure well.  There were no complications.  The patient was instructed to apply ice over the injection sites for twenty minutes every two hours for the next 24 to 48 hours.  The patient was also instructed to contact me immediately if there were any problems.  Joe Pittman D.O.

## 2024-01-09 ENCOUNTER — APPOINTMENT (OUTPATIENT)
Dept: ORTHOPEDIC SURGERY | Facility: CLINIC | Age: 69
End: 2024-01-09
Payer: MEDICARE

## 2024-01-09 VITALS — HEIGHT: 70 IN | WEIGHT: 188 LBS | BODY MASS INDEX: 26.92 KG/M2

## 2024-01-09 DIAGNOSIS — M70.60 TROCHANTERIC BURSITIS, UNSPECIFIED HIP: ICD-10-CM

## 2024-01-09 DIAGNOSIS — Z00.00 ENCOUNTER FOR GENERAL ADULT MEDICAL EXAMINATION W/OUT ABNORMAL FINDINGS: ICD-10-CM

## 2024-01-09 PROCEDURE — 99214 OFFICE O/P EST MOD 30 MIN: CPT

## 2024-01-09 RX ORDER — GABAPENTIN 300 MG/1
300 CAPSULE ORAL TWICE DAILY
Qty: 56 | Refills: 2 | Status: ACTIVE | COMMUNITY
Start: 2024-01-09 | End: 1900-01-01

## 2024-01-12 ENCOUNTER — NON-APPOINTMENT (OUTPATIENT)
Age: 69
End: 2024-01-12

## 2024-01-19 ENCOUNTER — APPOINTMENT (OUTPATIENT)
Dept: PAIN MANAGEMENT | Facility: CLINIC | Age: 69
End: 2024-01-19
Payer: MEDICARE

## 2024-01-19 VITALS — WEIGHT: 190 LBS | BODY MASS INDEX: 27.2 KG/M2 | HEIGHT: 70 IN

## 2024-01-19 DIAGNOSIS — Z96.642 PRESENCE OF LEFT ARTIFICIAL HIP JOINT: ICD-10-CM

## 2024-01-19 PROCEDURE — 99214 OFFICE O/P EST MOD 30 MIN: CPT

## 2024-01-19 NOTE — HISTORY OF PRESENT ILLNESS
[Lower back] : lower back [8] : 8 [2] : 2 [Dull/Aching] : dull/aching [Sharp] : sharp [Shooting] : shooting [Household chores] : household chores [Sleep] : sleep [Nothing helps with pain getting better] : Nothing helps with pain getting better [Standing] : standing [Walking] : walking [de-identified] : l mri  [9] : 9 [Constant] : constant [FreeTextEntry1] : 01/19/2024: s/p LEFT L4-5 L5-S1 TFESI on 01/04/24 with little relief. Still radiating down the left leg to the shin.   Initial HPI 12/21/2023: S/p left AMAURY with Dr. Infante on 10/2/23 and was feeling great until 3 weeks after the surgery when started having left lower back pain radiating to the left lateral hip and anterior shin described as a sharp shooting burning pain worse with standing for long periods. Saw Dr. Infante who recommended pain mgt for possible RADHA.   MRI Lumbar Spine 12/14/23 independently reviewed:  Conservative Care: has been doing PT  Pain Medications: tylenol PRN.  Past Injections: none Spine surgery: none  Blood thinners: none  [] : no [FreeTextEntry7] : lt leg

## 2024-01-19 NOTE — DISCUSSION/SUMMARY
[de-identified] : After discussing various treatment options with the patient including but not limited to oral medications, physical therapy, exercise modalities as well as interventional spinal injections, we have decided with the following plan:  - Continue Home exercises, stretching, activity modification, physical therapy, and conservative care. - MRI report and/or images was reviewed and discussed with the patient. - Recommend L4-5 Lumbar Epidural Steroid Injection under fluoroscopic guidance with image. (LEFT)  - The risks, benefits and alternatives of the proposed procedure were explained in detail with the patient. The risks outlined include but are not limited to infection, bleeding, post-dural puncture headache, nerve injury, a temporary increase in pain, failure to resolve symptoms, allergic reaction, symptom recurrence, and possible elevation of blood sugar in diabetics. All questions were answered to patient's apparent satisfaction and he/she verbalized an understanding. - Patient is presenting with acute/sub-acute radicular pain with impairment in ADLs and functionality.  The pain has not responded to conservative care including NSAID therapy and/or physical therapy.  There is no bleeding tendency, unstable medical condition, or systemic infection. - Follow up in 1-2 weeks post injection for re-evaluation.

## 2024-01-19 NOTE — PHYSICAL EXAM
[de-identified] : Constitutional; Appears well, no apparent distress Ability to communicate: Normal  Respiratory: non-labored breathing Skin: No rash noted Head: Normocephalic, atraumatic Neck: no visible thyroid enlargement Eyes: Extraocular movements intact Neurologic: Alert and oriented x3 Psychiatric: normal mood, affect and behavior [] : non-antalgic

## 2024-01-30 ENCOUNTER — APPOINTMENT (OUTPATIENT)
Dept: ORTHOPEDIC SURGERY | Facility: CLINIC | Age: 69
End: 2024-01-30

## 2024-02-01 ENCOUNTER — APPOINTMENT (OUTPATIENT)
Dept: PAIN MANAGEMENT | Facility: CLINIC | Age: 69
End: 2024-02-01
Payer: MEDICARE

## 2024-02-01 PROCEDURE — 62323 NJX INTERLAMINAR LMBR/SAC: CPT

## 2024-02-01 NOTE — PROCEDURE
[FreeTextEntry3] : Date of Service: 02/01/2024   Account: [id number]  Patient: RAMON ALAS JR   YOB: 1955  Age: 68 year   Surgeon:                                                         Joe Pittman D.O.  Pre-Operative Diagnosis:                             Lumbosacral radiculitis  Post-Operative Diagnosis:                           Same  Procedure:                                                      Interlaminar lumbar epidural steroid injection (L4-5) under fluoroscopic guidance  Anesthesia:                                                     Local with MAC   This procedure was carried out using fluoroscopic guidance.  The risks and benefits of the procedure were discussed extensively with the patient.  The consent of the patient was obtained and the following procedure was performed.  The patient was placed in the prone position.  The lumbar area was prepped and draped in a sterile fashion.  A timeout was performed with all essential staff present and the site and side were verified. Under AP view with slight cephalad-caudad angulation, the L4-5 interspace was identified and marked.  Using sterile technique, the superficial skin was anesthetized with 1% Lidocaine without epinephrine.  A 20-gauge Tuohy needle was advanced into the epidural space under fluoroscopy using cunmb-vomxgmfot-ohdcl technique and using loss of resistance at the L4-5 level.  After negative aspiration for heme or CSF, an epidurogram was obtained using 2-3 cc Omnipaque contrast injected under live fluoroscopy, confirming epidural placement of the needle.    Epidurogram showed no evidence of intrathecal or intravascular flow, and good evidence of bilateral epidural flow from L3-S2 levels.  After this, 4 cc of preservative free normal saline plus 12 mg of betamethasone were injected into the epidural space.  The needle was subsequently removed.  Anesthesia personnel were present throughout the procedure.  The patient tolerated the procedure well and was instructed to contact me immediately if there were any problems.  Joe Pittman D.O.

## 2024-02-16 ENCOUNTER — APPOINTMENT (OUTPATIENT)
Dept: PAIN MANAGEMENT | Facility: CLINIC | Age: 69
End: 2024-02-16
Payer: MEDICARE

## 2024-02-16 VITALS — HEIGHT: 70 IN | BODY MASS INDEX: 27.2 KG/M2 | WEIGHT: 190 LBS

## 2024-02-16 PROCEDURE — 99212 OFFICE O/P EST SF 10 MIN: CPT

## 2024-02-16 NOTE — PHYSICAL EXAM
[de-identified] : Constitutional; Appears well, no apparent distress Ability to communicate: Normal  Respiratory: non-labored breathing Skin: No rash noted Head: Normocephalic, atraumatic Neck: no visible thyroid enlargement Eyes: Extraocular movements intact Neurologic: Alert and oriented x3 Psychiatric: normal mood, affect and behavior [] : non-antalgic

## 2024-02-16 NOTE — HISTORY OF PRESENT ILLNESS
[Lower back] : lower back [9] : 9 [2] : 2 [Dull/Aching] : dull/aching [Sharp] : sharp [Shooting] : shooting [Constant] : constant [Household chores] : household chores [Sleep] : sleep [Nothing helps with pain getting better] : Nothing helps with pain getting better [Standing] : standing [Walking] : walking [FreeTextEntry1] : 02/16/2024: s/p L4-5 LESI on 02/01/24 with no relief and improvement of ADLs. Tried acupuncture, NSAIDs, PT with no relief. Gabapentin and Mobic with no relief.   01/19/2024: s/p LEFT L4-5 L5-S1 TFESI on 01/04/24 with little relief. Still radiating down the left leg to the shin.   Initial HPI 12/21/2023: S/p left AMAURY with Dr. Infante on 10/2/23 and was feeling great until 3 weeks after the surgery when started having left lower back pain radiating to the left lateral hip and anterior shin described as a sharp shooting burning pain worse with standing for long periods. Saw Dr. Infante who recommended pain mgt for possible RADHA.   MRI Lumbar Spine 12/14/23 independently reviewed: L4-5 spondy and HNP with left L4, L5 impingement.  Conservative Care: has been doing PT  Pain Medications: tylenol PRN.  Past Injections: none Spine surgery: none  Blood thinners: none  [] : no [FreeTextEntry7] : lt leg

## 2024-02-16 NOTE — DISCUSSION/SUMMARY
[de-identified] : After discussing various treatment options with the patient including but not limited to oral medications, physical therapy, exercise modalities as well as interventional spinal injections, we have decided with the following plan:  - Continue home exercises, stretching, activity modification, physical therapy, and conservative care. - Follow-up as needed. - Recommend to follow-up with a spine specialist for surgical consultation.

## 2024-02-17 ENCOUNTER — APPOINTMENT (OUTPATIENT)
Dept: ORTHOPEDIC SURGERY | Facility: CLINIC | Age: 69
End: 2024-02-17
Payer: MEDICARE

## 2024-02-17 DIAGNOSIS — M51.16 INTERVERTEBRAL DISC DISORDERS WITH RADICULOPATHY, LUMBAR REGION: ICD-10-CM

## 2024-02-17 PROCEDURE — 99215 OFFICE O/P EST HI 40 MIN: CPT

## 2024-02-17 NOTE — DATA REVIEWED
[MRI] : MRI [Lumbar Spine] : lumbar spine [Report was reviewed and noted in the chart] : The report was reviewed and noted in the chart [I independently reviewed and interpreted images and report] : I independently reviewed and interpreted images and report [FreeTextEntry1] : [facility] Spine MRI 12/14/23 1. L4-5 anterolisthesis, facet OA and posterior HNP with mild-moderate central stenosis and left L4,L5 root impingement with L>R foraminal stenosis.  2. L5-S1 anterolisthesis, facet OA and HNP with mild central stenosis and impingement of L>R L5.

## 2024-02-17 NOTE — HISTORY OF PRESENT ILLNESS
[de-identified] : 02/17/24: Patient is a 68 year old male here for his lower back. States after having LT hip sx October 2nd, 2023 with Dr. Infante. States 2-3 weeks later he developed pain in the lower back had an MRI completed. Pain radiates down left leg to shin. Standing for prolonged hours worsens the pain, sitting is okay. Had 2 inj's L4-5, L5-S1 TFESI on 01/04/24 and 02/01/24 w/o any relief. Concerned for his future presentations since symptoms haven't improved since onset.  [] : no

## 2024-02-17 NOTE — ASSESSMENT
[FreeTextEntry1] : at 46 on the left there is a herniation that is posterolateral-foraminal entry zone;  more likely to be accessible from a midline approach as opposed to a foraminal Kristi approach;  he has exhausted nonsurgical care;  he has opted to pursue surgery and I support that decision;  69 y/o M with left sided low back pain and LLE radiculopathy ongoing for 4 months after LT hip sx in October. MRI shows 2 slips at L4-5,5-S1 and a disc protrusion at L4-5 compressing L>R L4,5 nerve root. Has received 2 TFESI's at L4-5, 5-S1 w/o any relief. He presents with both L4 and L5 dermatome of pain. 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   - Discussed discectomy since he's exhausted conservative care w/o any benefit.  - The risks and benefits of surgery have been discussed. Risks include but are not limited to bleeding, infection, reaction to anesthesia, injury to blood vessels and nerves, malunion, nonunion, DVT, PE, necessity of repeat surgery, CF leak/repair, chronic pain, loss of limb and death. The patient understands the risks and agrees with the surgical plan. All questions have been answered.

## 2024-03-13 ENCOUNTER — OUTPATIENT (OUTPATIENT)
Dept: OUTPATIENT SERVICES | Facility: HOSPITAL | Age: 69
LOS: 1 days | Discharge: HOME | End: 2024-03-13
Payer: MEDICARE

## 2024-03-13 VITALS
SYSTOLIC BLOOD PRESSURE: 131 MMHG | DIASTOLIC BLOOD PRESSURE: 83 MMHG | HEART RATE: 82 BPM | WEIGHT: 197.53 LBS | RESPIRATION RATE: 17 BRPM | HEIGHT: 70 IN | TEMPERATURE: 98 F | OXYGEN SATURATION: 98 %

## 2024-03-13 DIAGNOSIS — M54.16 RADICULOPATHY, LUMBAR REGION: ICD-10-CM

## 2024-03-13 DIAGNOSIS — E11.9 TYPE 2 DIABETES MELLITUS WITHOUT COMPLICATIONS: ICD-10-CM

## 2024-03-13 DIAGNOSIS — E78.5 HYPERLIPIDEMIA, UNSPECIFIED: ICD-10-CM

## 2024-03-13 DIAGNOSIS — J38.1 POLYP OF VOCAL CORD AND LARYNX: Chronic | ICD-10-CM

## 2024-03-13 DIAGNOSIS — Z98.890 OTHER SPECIFIED POSTPROCEDURAL STATES: Chronic | ICD-10-CM

## 2024-03-13 DIAGNOSIS — Z01.818 ENCOUNTER FOR OTHER PREPROCEDURAL EXAMINATION: ICD-10-CM

## 2024-03-13 DIAGNOSIS — Z96.649 PRESENCE OF UNSPECIFIED ARTIFICIAL HIP JOINT: Chronic | ICD-10-CM

## 2024-03-13 LAB
ANION GAP SERPL CALC-SCNC: 7 MMOL/L — SIGNIFICANT CHANGE UP (ref 5–17)
APTT BLD: 38.1 SEC — HIGH (ref 24.5–35.6)
BASOPHILS # BLD AUTO: 0.05 K/UL — SIGNIFICANT CHANGE UP (ref 0–0.2)
BASOPHILS NFR BLD AUTO: 0.7 % — SIGNIFICANT CHANGE UP (ref 0–2)
BUN SERPL-MCNC: 19 MG/DL — SIGNIFICANT CHANGE UP (ref 7–23)
CALCIUM SERPL-MCNC: 9.5 MG/DL — SIGNIFICANT CHANGE UP (ref 8.5–10.1)
CHLORIDE SERPL-SCNC: 113 MMOL/L — HIGH (ref 96–108)
CO2 SERPL-SCNC: 25 MMOL/L — SIGNIFICANT CHANGE UP (ref 22–31)
CREAT SERPL-MCNC: 0.95 MG/DL — SIGNIFICANT CHANGE UP (ref 0.5–1.3)
EGFR: 88 ML/MIN/1.73M2 — SIGNIFICANT CHANGE UP
EOSINOPHIL # BLD AUTO: 0.26 K/UL — SIGNIFICANT CHANGE UP (ref 0–0.5)
EOSINOPHIL NFR BLD AUTO: 3.5 % — SIGNIFICANT CHANGE UP (ref 0–6)
GLUCOSE SERPL-MCNC: 91 MG/DL — SIGNIFICANT CHANGE UP (ref 70–99)
HCT VFR BLD CALC: 47.3 % — SIGNIFICANT CHANGE UP (ref 39–50)
HGB BLD-MCNC: 15.7 G/DL — SIGNIFICANT CHANGE UP (ref 13–17)
IMM GRANULOCYTES NFR BLD AUTO: 0.1 % — SIGNIFICANT CHANGE UP (ref 0–0.9)
INR BLD: 0.86 RATIO — SIGNIFICANT CHANGE UP (ref 0.85–1.18)
LYMPHOCYTES # BLD AUTO: 2.1 K/UL — SIGNIFICANT CHANGE UP (ref 1–3.3)
LYMPHOCYTES # BLD AUTO: 28.6 % — SIGNIFICANT CHANGE UP (ref 13–44)
MCHC RBC-ENTMCNC: 30.1 PG — SIGNIFICANT CHANGE UP (ref 27–34)
MCHC RBC-ENTMCNC: 33.2 G/DL — SIGNIFICANT CHANGE UP (ref 32–36)
MCV RBC AUTO: 90.6 FL — SIGNIFICANT CHANGE UP (ref 80–100)
MONOCYTES # BLD AUTO: 0.6 K/UL — SIGNIFICANT CHANGE UP (ref 0–0.9)
MONOCYTES NFR BLD AUTO: 8.2 % — SIGNIFICANT CHANGE UP (ref 2–14)
NEUTROPHILS # BLD AUTO: 4.33 K/UL — SIGNIFICANT CHANGE UP (ref 1.8–7.4)
NEUTROPHILS NFR BLD AUTO: 58.9 % — SIGNIFICANT CHANGE UP (ref 43–77)
NRBC # BLD: 0 /100 WBCS — SIGNIFICANT CHANGE UP (ref 0–0)
PLATELET # BLD AUTO: 215 K/UL — SIGNIFICANT CHANGE UP (ref 150–400)
POTASSIUM SERPL-MCNC: 4.5 MMOL/L — SIGNIFICANT CHANGE UP (ref 3.5–5.3)
POTASSIUM SERPL-SCNC: 4.5 MMOL/L — SIGNIFICANT CHANGE UP (ref 3.5–5.3)
PROTHROM AB SERPL-ACNC: 10.4 SEC — SIGNIFICANT CHANGE UP (ref 9.5–13)
RBC # BLD: 5.22 M/UL — SIGNIFICANT CHANGE UP (ref 4.2–5.8)
RBC # FLD: 14 % — SIGNIFICANT CHANGE UP (ref 10.3–14.5)
SODIUM SERPL-SCNC: 145 MMOL/L — SIGNIFICANT CHANGE UP (ref 135–145)
WBC # BLD: 7.35 K/UL — SIGNIFICANT CHANGE UP (ref 3.8–10.5)
WBC # FLD AUTO: 7.35 K/UL — SIGNIFICANT CHANGE UP (ref 3.8–10.5)

## 2024-03-13 PROCEDURE — 93010 ELECTROCARDIOGRAM REPORT: CPT

## 2024-03-13 NOTE — H&P PST ADULT - HISTORY OF PRESENT ILLNESS
67 yo male c/o back pains  secondary to lumbar compression - scheduled for lumbar diskectomy   denies recent travels in the past 30 days. No fever, SOB, cough, flu like symptoms or body rash- covid screen

## 2024-03-13 NOTE — H&P PST ADULT - NSICDXPASTSURGICALHX_GEN_ALL_CORE_FT
PAST SURGICAL HISTORY:  H/O colonoscopy     H/O knee surgery     S/P total hip arthroplasty     Vocal cord polyp

## 2024-03-13 NOTE — H&P PST ADULT - ASSESSMENT
lumbar compression   CAPRINI SCORE    AGE RELATED RISK FACTORS                                                       MOBILITY RELATED FACTORS  [ ] Age 41-60 years                                            (1 Point)                  [ ] Bed rest                                                        (1 Point)  [x ] Age: 61-74 years                                           (2 Points)                [ ] Plaster cast                                                   (2 Points)  [ ] Age= 75 years                                              (3 Points)                 [ ] Bed bound for more than 72 hours                   (2 Points)    DISEASE RELATED RISK FACTORS                                               GENDER SPECIFIC FACTORS  [ ] Edema in the lower extremities                       (1 Point)                  [ ] Pregnancy                                                     (1 Point)  [ ] Varicose veins                                               (1 Point)                  [ ] Post-partum < 6 weeks                                   (1 Point)             [x ] BMI > 25 Kg/m2                                            (1 Point)                  [ ] Hormonal therapy  or oral contraception            (1 Point)                 [ ] Sepsis (in the previous month)                        (1 Point)                  [ ] History of pregnancy complications  [ ] Pneumonia or serious lung disease                                               [ ] Unexplained or recurrent                       (1 Point)           (in the previous month)                               (1 Point)  [ ] Abnormal pulmonary function test                     (1 Point)                 SURGERY RELATED RISK FACTORS  [ ] Acute myocardial infarction                              (1 Point)                 [ ]  Section                                            (1 Point)  [ ] Congestive heart failure (in the previous month)  (1 Point)                 [ ] Minor surgery                                                 (1 Point)   [ ] Inflammatory bowel disease                             (1 Point)                 [ x] Arthroscopic surgery                                        (2 Points)  [ ] Central venous access                                    (2 Points)                [ ] General surgery lasting more than 45 minutes   (2 Points)       [ ] Stroke (in the previous month)                          (5 Points)               [ ] Elective arthroplasty                                        (5 Points)                                                                                                                                               HEMATOLOGY RELATED FACTORS                                                 TRAUMA RELATED RISK FACTORS  [ ] Prior episodes of VTE                                     (3 Points)                 [ ] Fracture of the hip, pelvis, or leg                       (5 Points)  [ ] Positive family history for VTE                         (3 Points)                 [ ] Acute spinal cord injury (in the previous month)  (5 Points)  [ ] Prothrombin 40061 A                                      (3 Points)                 [ ] Paralysis  (less than 1 month)                          (5 Points)  [ ] Factor V Leiden                                             (3 Points)                 [ ] Multiple Trauma within 1 month                         (5 Points)  [ ] Lupus anticoagulants                                     (3 Points)                                                           [ ] Anticardiolipin antibodies                                (3 Points)                                                       [ ] High homocysteine in the blood                      (3 Points)                                             [ ] Other congenital or acquired thrombophilia       (3 Points)                                                [ ] Heparin induced thrombocytopenia                  (3 Points)                                          Total Score [      5    ]  Caprini Score 0-2: Low risk, No VTE Prophylaxis required for most patient's, encourage ambulation  Caprini Score 3-6: At Risk, Pharmacologic VTE prophylaxis is indicated for most patients ( in the absence of a contraindication)  Caprini Score Greater than or = 7: High Risk , pharmacologic VTE is indicated for most patients ( in the absence of a contraindication)    Caprini score indicates that the patient is high risk for VTE event ( score 6 or greater). Surgical patient's in this group will benefit from both pharmacologic prophylaxis and intermittent compression devices . Surgical team will determine the balance between VTE  risk and bleeding risk and other clinical considerations

## 2024-03-26 ENCOUNTER — TRANSCRIPTION ENCOUNTER (OUTPATIENT)
Age: 69
End: 2024-03-26

## 2024-03-27 ENCOUNTER — TRANSCRIPTION ENCOUNTER (OUTPATIENT)
Age: 69
End: 2024-03-27

## 2024-03-27 ENCOUNTER — OUTPATIENT (OUTPATIENT)
Dept: OUTPATIENT SERVICES | Facility: HOSPITAL | Age: 69
LOS: 1 days | Discharge: HOME | End: 2024-03-27

## 2024-03-27 ENCOUNTER — APPOINTMENT (OUTPATIENT)
Dept: ORTHOPEDIC SURGERY | Facility: HOSPITAL | Age: 69
End: 2024-03-27
Payer: MEDICARE

## 2024-03-27 VITALS
SYSTOLIC BLOOD PRESSURE: 108 MMHG | RESPIRATION RATE: 12 BRPM | HEART RATE: 74 BPM | OXYGEN SATURATION: 97 % | DIASTOLIC BLOOD PRESSURE: 71 MMHG

## 2024-03-27 VITALS
DIASTOLIC BLOOD PRESSURE: 83 MMHG | OXYGEN SATURATION: 97 % | RESPIRATION RATE: 16 BRPM | SYSTOLIC BLOOD PRESSURE: 115 MMHG | HEART RATE: 88 BPM | TEMPERATURE: 98 F | WEIGHT: 190.04 LBS | HEIGHT: 70 IN

## 2024-03-27 DIAGNOSIS — J38.1 POLYP OF VOCAL CORD AND LARYNX: Chronic | ICD-10-CM

## 2024-03-27 DIAGNOSIS — Z98.890 OTHER SPECIFIED POSTPROCEDURAL STATES: Chronic | ICD-10-CM

## 2024-03-27 DIAGNOSIS — Z96.649 PRESENCE OF UNSPECIFIED ARTIFICIAL HIP JOINT: Chronic | ICD-10-CM

## 2024-03-27 PROCEDURE — 63030 LAMOT DCMPRN NRV RT 1 LMBR: CPT | Mod: LT

## 2024-03-27 DEVICE — BONE WAX 2.5GM: Type: IMPLANTABLE DEVICE | Site: LEFT | Status: FUNCTIONAL

## 2024-03-27 DEVICE — SURGIFLO MATRIX WITH THROMBIN KIT: Type: IMPLANTABLE DEVICE | Site: LEFT | Status: FUNCTIONAL

## 2024-03-27 RX ORDER — ROSUVASTATIN CALCIUM 5 MG/1
1 TABLET ORAL
Refills: 0 | DISCHARGE

## 2024-03-27 RX ORDER — ONDANSETRON 8 MG/1
4 TABLET, FILM COATED ORAL ONCE
Refills: 0 | Status: DISCONTINUED | OUTPATIENT
Start: 2024-03-27 | End: 2024-03-27

## 2024-03-27 RX ORDER — TIZANIDINE 4 MG/1
4 TABLET ORAL TWICE DAILY
Qty: 20 | Refills: 0 | Status: ACTIVE | COMMUNITY
Start: 2024-03-27 | End: 1900-01-01

## 2024-03-27 RX ORDER — FENTANYL CITRATE 50 UG/ML
50 INJECTION INTRAVENOUS
Refills: 0 | Status: DISCONTINUED | OUTPATIENT
Start: 2024-03-27 | End: 2024-03-27

## 2024-03-27 RX ORDER — SODIUM CHLORIDE 9 MG/ML
3 INJECTION INTRAMUSCULAR; INTRAVENOUS; SUBCUTANEOUS EVERY 8 HOURS
Refills: 0 | Status: DISCONTINUED | OUTPATIENT
Start: 2024-03-27 | End: 2024-03-27

## 2024-03-27 RX ORDER — METFORMIN HYDROCHLORIDE 850 MG/1
1 TABLET ORAL
Refills: 0 | DISCHARGE

## 2024-03-27 RX ORDER — OXYCODONE 5 MG/1
5 TABLET ORAL
Qty: 40 | Refills: 0 | Status: ACTIVE | COMMUNITY
Start: 2024-03-27 | End: 1900-01-01

## 2024-03-27 RX ORDER — EZETIMIBE 10 MG/1
1 TABLET ORAL
Refills: 0 | DISCHARGE

## 2024-03-27 RX ORDER — SODIUM CHLORIDE 9 MG/ML
1000 INJECTION, SOLUTION INTRAVENOUS
Refills: 0 | Status: DISCONTINUED | OUTPATIENT
Start: 2024-03-27 | End: 2024-03-27

## 2024-03-27 RX ORDER — FENTANYL CITRATE 50 UG/ML
25 INJECTION INTRAVENOUS
Refills: 0 | Status: DISCONTINUED | OUTPATIENT
Start: 2024-03-27 | End: 2024-03-27

## 2024-03-27 RX ADMIN — SODIUM CHLORIDE 75 MILLILITER(S): 9 INJECTION, SOLUTION INTRAVENOUS at 10:26

## 2024-03-27 RX ADMIN — FENTANYL CITRATE 25 MICROGRAM(S): 50 INJECTION INTRAVENOUS at 10:26

## 2024-03-27 NOTE — ASU DISCHARGE PLAN (ADULT/PEDIATRIC) - NS MD DC FALL RISK RISK
For information on Fall & Injury Prevention, visit: [email or web address] OR  [email or web address] OR  https://www.cdc.gov/steadi/patient.html

## 2024-03-27 NOTE — ASU DISCHARGE PLAN (ADULT/PEDIATRIC) - NURSING INSTRUCTIONS
Patient to rest today, Follow up with Dr. Sosa as planned. Frequent hand washing advised to prevent infection.

## 2024-03-27 NOTE — BRIEF OPERATIVE NOTE - DISPOSITION
3/18/2022         RE: Kathi Linn  [id number] [facility] [place] [address]        Dear Colleague,    Thank you for referring your patient, Kathi Linn, to the [facility]. Please see a copy of my visit note below.    ASSESSMENT:  Encounter Diagnoses   Name Primary?     Injury of left foot, initial encounter Yes     Left foot pain      MEDICAL DECISION MAKING:  I personally reviewed the x-ray images with Kathi.  I do not appreciate any fractures or dislocations.  Obviously she is having pain and tissues have been injured.  This might involve a bone contusion, stress reaction of bone, capsular/ligament injury and even articular surface injury.    This warrants rest.  She would like to do everything she can to resolve this and continue on with regular exercise.    She is agreeable to CAM Walker immobilization for 2 to 4 weeks.  I suggest she place an orthotic or arch support within the device.  We discussed the possibility of cast atrophy, other musculoskeletal pain due to ambulating in the boot and DVT.  She was instructed on ankle range of motion exercises.    Relative rest, ice, over-the-counter pain relievers were discussed.    Follow-up in 2 to 3 weeks.  We will re x-ray at that time.    Disclaimer: This note consists of symbols derived from keyboarding, dictation and/or voice recognition software. As a result, there may be errors in the script that have gone undetected. Please consider this when interpreting information found in this chart.    Jori Cadena DPM, FACFAS, [facility] Department of Podiatry/Foot & Ankle Surgery      ____________________________________________________________________    HPI:         Kathi presents today reporting injury to her left foot.  This occurred 8 days ago when she stubbed her fifth toe into a dresser.  She was evaluated in urgent care.  X-rays were negative for fracture.  She continues to have an aching pain rated a  4/10.  She denies limping but does have pain with walking.  She walks up to 3 miles for exercise.  She says she has a cold with long haul her with eventually like to get back to running.  This injury is a setback.    *  Past Medical History:   Diagnosis Date     Occipital neuralgia      Uncomplicated asthma 9/19/2006   *  *  Past Surgical History:   Procedure Laterality Date     ABDOMEN SURGERY      DUODENAL SWITCH  2007     C/SECTION, CLASSICAL  1999, 2002     CHOLECYSTECTOMY       HERNIORRHAPHY VENTRAL  10/2/2013    Procedure: HERNIORRHAPHY VENTRAL;  Open Ventral hernia Repair with mesh;  Surgeon: Orly Dunlap MD;  Location:  OR   *  *  Current Outpatient Medications   Medication Sig Dispense Refill     alpha-lipoic acid 600 MG capsule Take 600 mg by mouth 2 capsules daily       BIOTIN 1 tablet daily        CALCIUM 500 MG OR TABS   0     cholecalciferol (VITAMIN D3) 5000 UNITS CAPS capsule Take 5,000 Units by mouth daily       Copper Gluconate (COPPER CAPS) 2 MG CAPS Take 1 capsule by mouth daily       ferrous gluconate (FERGON) 324 (38 FE) MG tablet Take 1 tablet (324 mg) by mouth 2 times daily 30 tablet 0     GLUTAMINE PO Take 2 g by mouth daily       Magnesium Hydroxide (MAGNESIA PO) Take 1 tablet by mouth daily Potassium 85 mg in it, 170mg magnesium       MULTIVITAMIN TABS   OR   0     VITAMIN A EX 5000 1 tab qd        VITAMIN E 1 tablet daily.       VITAMIN K PO Take 1 tablet by mouth daily.       Zinc Acetate, Oral, (ZINC ACETATE PO)            EXAM:    Vitals: Wt 82.6 kg (182 lb)   BMI 26.88 kg/m    BMI: Body mass index is 26.88 kg/m .    Constitutional:  Kathi Linn is in no apparent distress, appears well-nourished.  Cooperative with history and physical exam.    Vascular:  Pedal pulses are palpable for both the DP and PT arteries.  CFT < 3 sec.  No edema.      Neuro: Light touch sensation is intact to the L4, L5, S1 distributions  No evidence of weakness, spasticity, or contracture in  the lower extremities.     Derm: Normal texture and turgor.  No erythema, ecchymosis, or cyanosis.  No open lesions.     Musculoskeletal:    Lower extremity muscle strength is normal. No gross deformities.  She is able to extend and flex the left fifth toe against resistance.  There is pain on palpation proximal to the toe over the fifth metatarsal neck and head.    XR TOE LEFT G/E 2 VIEWS 3/8/2022 3:41 PM     HISTORY: r/o fracture. Jamming injury this morning left 5th toe.; Pain  of toe of left foot     COMPARISON: None.     FINDINGS: No fracture or dislocation. No degenerative changes.     ZAINA LEVY MD             Again, thank you for allowing me to participate in the care of your patient.        Sincerely,        Jori Cadena DPM     pacu to home

## 2024-03-27 NOTE — ASU DISCHARGE PLAN (ADULT/PEDIATRIC) - ASU DC SPECIAL INSTRUCTIONSFT
ok to remove and change dressing with sterile gauze post op day #3. Keep covered if wound is draining. Do not remove mesh adhesive on top of incision. Do not scrub or submerge wound. Do not apply any lotions creams or soaps.

## 2024-03-27 NOTE — ASU DISCHARGE PLAN (ADULT/PEDIATRIC) - DIET/FLUID RESTRICTION
No change Olanzapine Counseling- I discussed with the patient the common side effects of olanzapine including but are not limited to: lack of energy, dry mouth, increased appetite, sleepiness, tremor, constipation, dizziness, changes in behavior, or restlessness.  Explained that teenagers are more likely to experience headaches, abdominal pain, pain in the arms or legs, tiredness, and sleepiness.  Serious side effects include but are not limited: increased risk of death in elderly patients who are confused, have memory loss, or dementia-related psychosis; hyperglycemia; increased cholesterol and triglycerides; and weight gain.

## 2024-03-27 NOTE — ASU PATIENT PROFILE, ADULT - FALL HARM RISK - UNIVERSAL INTERVENTIONS
Bed in lowest position, wheels locked, appropriate side rails in place/Call bell, personal items and telephone in reach/Instruct patient to call for assistance before getting out of bed or chair/Non-slip footwear when patient is out of bed/[place] to call system/Physically safe environment - no spills, clutter or unnecessary equipment/Purposeful Proactive Rounding/Room/bathroom lighting operational, light cord in reach

## 2024-03-27 NOTE — BRIEF OPERATIVE NOTE - ELECTIVE PROCEDURE
Yes no nausea/no jaundice/no change in bowel habits/no abdominal pain/no hiccoughs/no vomiting/no constipation/no diarrhea/no melena

## 2024-03-29 ENCOUNTER — NON-APPOINTMENT (OUTPATIENT)
Age: 69
End: 2024-03-29

## 2024-03-29 RX ORDER — HYDROMORPHONE HYDROCHLORIDE 4 MG/1
4 TABLET ORAL
Qty: 42 | Refills: 0 | Status: ACTIVE | COMMUNITY
Start: 2024-03-29 | End: 1900-01-01

## 2024-04-01 DIAGNOSIS — E78.5 HYPERLIPIDEMIA, UNSPECIFIED: ICD-10-CM

## 2024-04-01 DIAGNOSIS — M51.16 INTERVERTEBRAL DISC DISORDERS WITH RADICULOPATHY, LUMBAR REGION: ICD-10-CM

## 2024-04-01 DIAGNOSIS — I10 ESSENTIAL (PRIMARY) HYPERTENSION: ICD-10-CM

## 2024-04-01 DIAGNOSIS — Z91.013 ALLERGY TO SEAFOOD: ICD-10-CM

## 2024-04-01 DIAGNOSIS — E11.9 TYPE 2 DIABETES MELLITUS WITHOUT COMPLICATIONS: ICD-10-CM

## 2024-04-01 DIAGNOSIS — Z79.84 LONG TERM (CURRENT) USE OF ORAL HYPOGLYCEMIC DRUGS: ICD-10-CM

## 2024-04-09 ENCOUNTER — APPOINTMENT (OUTPATIENT)
Dept: ORTHOPEDIC SURGERY | Facility: CLINIC | Age: 69
End: 2024-04-09
Payer: MEDICARE

## 2024-04-09 VITALS — HEIGHT: 70 IN | WEIGHT: 190 LBS | BODY MASS INDEX: 27.2 KG/M2

## 2024-04-09 DIAGNOSIS — Z78.9 OTHER SPECIFIED HEALTH STATUS: ICD-10-CM

## 2024-04-09 PROCEDURE — 99024 POSTOP FOLLOW-UP VISIT: CPT

## 2024-04-09 PROCEDURE — 72100 X-RAY EXAM L-S SPINE 2/3 VWS: CPT

## 2024-04-09 NOTE — HISTORY OF PRESENT ILLNESS
[0] : 0 [de-identified] : DOS: 3/27/24 LEFT L4-5 DISCECTOMY  4/9/24: PO#1- L Spine. 2 weeks s/p discectomy. Doing well since sx. He was rushed back to ER 2 days after due to hematoma at sx site. Had 2nd surgery for a washup on 3/29. Feels soreness but doing much better. No pain. Ambulating w/o a cane.   02/17/24: Patient is a 68 year old male here for his lower back. States after having LT hip sx October 2nd, 2023 with Dr. Infante. States 2-3 weeks later he developed pain in the lower back had an MRI completed. Pain radiates down left leg to shin. Standing for prolonged hours worsens the pain, sitting is okay. Had 2 inj's L4-5, L5-S1 TFESI on 01/04/24 and 02/01/24 w/o any relief. Concerned for his future presentations since symptoms haven't improved since onset.  [] : no

## 2024-04-09 NOTE — ASSESSMENT
[FreeTextEntry1] : PO#1. 2 weeks s/p L4-5 left discectomy. Had 2nd surgery 2 days after d/t hematoma. Incision w/o signs of infection. Doing well, no pain, remains with some soreness. No longer needing assistance device.   - Hold off PT for now, Ed works out regularly, will give us a call in 2 weeks if residual soreness isn't improving, and if so, will start PT.    - F/up in 4 weeks for 2nd postop.

## 2024-04-09 NOTE — PHYSICAL EXAM
[No bony abnormalities] : No bony abnormalities [de-identified] : L Spine Inspection: Incision w/o signs of infection.  Palpation: No tenderness or spasms in b/l lumbar paraspinal musculature ROM: Full with stiffness  Strength: 5/5 b/l hip flexors, knee extensors, ankle dorsiflexors, EHL, ankle plantarflexors Neuro: Sensation LT I Negative b/l SLR Toe and heal walk intact Gait non antalgic

## 2024-04-19 ENCOUNTER — NON-APPOINTMENT (OUTPATIENT)
Age: 69
End: 2024-04-19

## 2024-05-02 ENCOUNTER — TRANSCRIPTION ENCOUNTER (OUTPATIENT)
Age: 69
End: 2024-05-02

## 2024-05-07 ENCOUNTER — APPOINTMENT (OUTPATIENT)
Dept: ORTHOPEDIC SURGERY | Facility: CLINIC | Age: 69
End: 2024-05-07
Payer: MEDICARE

## 2024-05-07 VITALS — BODY MASS INDEX: 27.2 KG/M2 | HEIGHT: 70 IN | WEIGHT: 190 LBS

## 2024-05-07 DIAGNOSIS — M54.50 LOW BACK PAIN, UNSPECIFIED: ICD-10-CM

## 2024-05-07 DIAGNOSIS — M54.17 RADICULOPATHY, LUMBOSACRAL REGION: ICD-10-CM

## 2024-05-07 PROCEDURE — 99024 POSTOP FOLLOW-UP VISIT: CPT

## 2024-05-07 NOTE — ASSESSMENT
[FreeTextEntry1] : PO#2. ~6 weeks s/p L4-5 left discectomy and evacuation of hematoma. Will start PT for residual soreness. Activity modifications discussed. fu 6 weeks.   Progress note completed by Mar Casillas PA-C under the supervision of Clayton Sosa MD

## 2024-05-07 NOTE — PHYSICAL EXAM
[No bony abnormalities] : No bony abnormalities [de-identified] : L Spine Inspection: Incision healed Palpation: No tenderness or spasms in b/l lumbar paraspinal musculature ROM: Full with stiffness  Strength: 5/5 b/l hip flexors, knee extensors, ankle dorsiflexors, EHL, ankle plantarflexors Neuro: Sensation LT I Negative b/l SLR Toe and heal walk intact Gait non antalgic

## 2024-05-07 NOTE — HISTORY OF PRESENT ILLNESS
[0] : 0 [de-identified] : DOS: 3/27/24 LEFT L4-5 DISCECTOMY  5/7/24: about 6 weeks s/p discectomy 3/27/24 and laminectomy 3/29/24 @ [facility] for epidural hematoma. Preop leg symptoms are gone. stiffness and soreness in the back.  4/9/24: PO#1- L Spine. 2 weeks s/p discectomy. Doing well since sx. He was rushed back to ER 2 days after due to hematoma at sx site. Had 2nd surgery for a washup on 3/29. Feels soreness but doing much better. No pain. Ambulating w/o a cane.   02/17/24: Patient is a 68 year old male here for his lower back. States after having LT hip sx October 2nd, 2023 with Dr. Infante. States 2-3 weeks later he developed pain in the lower back had an MRI completed. Pain radiates down left leg to shin. Standing for prolonged hours worsens the pain, sitting is okay. Had 2 inj's L4-5, L5-S1 TFESI on 01/04/24 and 02/01/24 w/o any relief. Concerned for his future presentations since symptoms haven't improved since onset.  [] : no [FreeTextEntry5] : PO#2- L Spine. Doing much better since last visit, Feels tightness and soreness in low back.

## 2024-05-08 ENCOUNTER — NON-APPOINTMENT (OUTPATIENT)
Age: 69
End: 2024-05-08

## 2024-05-15 LAB
CRP SERPL-MCNC: <3 MG/L
ERYTHROCYTE [SEDIMENTATION RATE] IN BLOOD BY WESTERGREN METHOD: 9 MM/HR
WBC # FLD AUTO: 6.84 K/UL

## 2024-06-19 ENCOUNTER — APPOINTMENT (OUTPATIENT)
Dept: ORTHOPEDIC SURGERY | Facility: CLINIC | Age: 69
End: 2024-06-19
Payer: MEDICARE

## 2024-06-19 DIAGNOSIS — Z98.890 OTHER SPECIFIED POSTPROCEDURAL STATES: ICD-10-CM

## 2024-06-19 DIAGNOSIS — M43.16 SPONDYLOLISTHESIS, LUMBAR REGION: ICD-10-CM

## 2024-06-19 PROCEDURE — 99024 POSTOP FOLLOW-UP VISIT: CPT

## 2024-06-25 NOTE — HISTORY OF PRESENT ILLNESS
[0] : 0 [de-identified] : DOS: 3/27/24 LEFT L4-5 DISCECTOMY  6/19/24: PO #3. Doing a bit better since last visit. Remains with some stiffness when sitting or lying down for prolonged periods of time. Resuming PT in the next couple of days.   5/7/24: about 6 weeks s/p discectomy 3/27/24 and laminectomy 3/29/24 @ [facility] for epidural hematoma. Preop leg symptoms are gone. stiffness and soreness in the back.  4/9/24: PO#1- L Spine. 2 weeks s/p discectomy. Doing well since sx. He was rushed back to ER 2 days after due to hematoma at sx site. Had 2nd surgery for a washup on 3/29. Feels soreness but doing much better. No pain. Ambulating w/o a cane.   02/17/24: Patient is a 68 year old male here for his lower back. States after having LT hip sx October 2nd, 2023 with Dr. Infante. States 2-3 weeks later he developed pain in the lower back had an MRI completed. Pain radiates down left leg to shin. Standing for prolonged hours worsens the pain, sitting is okay. Had 2 inj's L4-5, L5-S1 TFESI on 01/04/24 and 02/01/24 w/o any relief. Concerned for his future presentations since symptoms haven't improved since onset.  [] : no

## 2024-06-25 NOTE — PHYSICAL EXAM
[No bony abnormalities] : No bony abnormalities [de-identified] : L Spine Inspection: Incision healed Palpation: No tenderness or spasms in b/l lumbar paraspinal musculature ROM: Full with some stiffness.  Strength: 5/5 b/l hip flexors, knee extensors, ankle dorsiflexors, EHL, ankle plantarflexors Neuro: Sensation LT I Negative b/l SLR Toe and heal walk intact Gait non antalgic

## 2024-06-25 NOTE — ASSESSMENT
[FreeTextEntry1] : PO#3. 10 weeks s/p L4-5 left discectomy and evacuation of hematoma. Hasn't been able to complete PT, remains with some stiffness when sitting for prolonged periods. Will resume PT next week. F/up in 2 months if symptoms persist.

## 2024-06-27 ENCOUNTER — NON-APPOINTMENT (OUTPATIENT)
Age: 69
End: 2024-06-27

## 2024-08-12 ENCOUNTER — NON-APPOINTMENT (OUTPATIENT)
Age: 69
End: 2024-08-12

## 2024-08-12 ENCOUNTER — APPOINTMENT (OUTPATIENT)
Dept: ORTHOPEDIC SURGERY | Facility: CLINIC | Age: 69
End: 2024-08-12
Payer: MEDICARE

## 2024-08-12 DIAGNOSIS — Z98.890 OTHER SPECIFIED POSTPROCEDURAL STATES: ICD-10-CM

## 2024-08-12 PROCEDURE — 99214 OFFICE O/P EST MOD 30 MIN: CPT

## 2024-08-12 NOTE — IMAGING
[de-identified] : L Spine Inspection: Incision healed Palpation: No tenderness or spasms in b/l lumbar paraspinal musculature ROM: Full with some stiffness.  Strength: 5/5 b/l hip flexors, knee extensors, ankle dorsiflexors, EHL, ankle plantarflexors Neuro: Sensation LT I Negative b/l SLR Toe and heal walk intact Gait non antalgic  Allergy; Fall Risk;

## 2024-08-12 NOTE — ASSESSMENT
[FreeTextEntry1] : Almost 5 months s/p L4-5 left discectomy and evacuation of hematoma. Despite completing months of PT remains with left lower back soreness and pain.   - Ordered postop L Spine MRI to eval residual stenosis.  - F/up after MRI.

## 2024-08-12 NOTE — HISTORY OF PRESENT ILLNESS
[0] : 0 [de-identified] : DOS: 3/27/24 Left L4-5 Discectomy  8/12/24: Follow up L Spine. ~8 months s/p left L4-5 discectomy. Persistent with left lower back soreness. Pain when getting up from sitting position. Seeing Dr. Infante tomorrow.   6/19/24: PO #3. Doing a bit better since last visit. Remains with some stiffness when sitting or lying down for prolonged periods of time. Resuming PT in the next couple of days.   5/7/24: about 6 weeks s/p discectomy 3/27/24 and laminectomy 3/29/24 @ [facility] for epidural hematoma. Preop leg symptoms are gone. stiffness and soreness in the back.  4/9/24: PO#1- L Spine. 2 weeks s/p discectomy. Doing well since sx. He was rushed back to ER 2 days after due to hematoma at sx site. Had 2nd surgery for a washup on 3/29. Feels soreness but doing much better. No pain. Ambulating w/o a cane.   02/17/24: Patient is a 68 year old male here for his lower back. States after having LT hip sx October 2nd, 2023 with Dr. Infante. States 2-3 weeks later he developed pain in the lower back had an MRI completed. Pain radiates down left leg to shin. Standing for prolonged hours worsens the pain, sitting is okay. Had 2 inj's L4-5, L5-S1 TFESI on 01/04/24 and 02/01/24 w/o any relief. Concerned for his future presentations since symptoms haven't improved since onset.  [] : no

## 2024-08-13 ENCOUNTER — APPOINTMENT (OUTPATIENT)
Dept: ORTHOPEDIC SURGERY | Facility: CLINIC | Age: 69
End: 2024-08-13
Payer: MEDICARE

## 2024-08-13 ENCOUNTER — APPOINTMENT (OUTPATIENT)
Dept: MRI IMAGING | Facility: CLINIC | Age: 69
End: 2024-08-13
Payer: MEDICARE

## 2024-08-13 DIAGNOSIS — M70.60 TROCHANTERIC BURSITIS, UNSPECIFIED HIP: ICD-10-CM

## 2024-08-13 PROCEDURE — 99214 OFFICE O/P EST MOD 30 MIN: CPT

## 2024-08-13 PROCEDURE — 72148 MRI LUMBAR SPINE W/O DYE: CPT | Mod: MH

## 2024-08-13 PROCEDURE — 73502 X-RAY EXAM HIP UNI 2-3 VIEWS: CPT

## 2024-08-13 RX ORDER — GABAPENTIN 300 MG/1
300 CAPSULE ORAL
Qty: 30 | Refills: 2 | Status: ACTIVE | COMMUNITY
Start: 2024-08-13 | End: 1900-01-01

## 2024-08-13 NOTE — PHYSICAL EXAM
[de-identified] : POSTOP LEFT HIP EXAM Edema: mild well healing surgical incision without surrounding erythema/drainage  ROM 0-90 degrees of flexion No pain with IR/ER ROM  Neurovascular exam Motor function 5/5 distal lower extremity Sensation to light touch: intact Distal pulses: 2+  XR of the L hip today demonstrate AMAURY in place w/o signs of interval changes from postoperative XR

## 2024-08-13 NOTE — ASSESSMENT
[FreeTextEntry1] : 66 year M with left hip OA and lumbar strain - physical therapy, NSAIDs, MDP for [place] - Return in 6 weeks for follow up  2022 continues to have hip pain with likley overlying lumbar radic. - did not take MDP and did some PT - will peform dx and therapeutic hip injection under guidance.  1/3/23: Good relieve with IA CSI. Follow up PRN. Discussed possibility of eventual AMAURY.  2023 pain is back after 4 months from injection. Follow up Dr. Evans for injection CSI  8/15/23: all preop questions and concerns answered  23: During this visit and in depth review of preoperative and postoperative expectations were reviewed. the images were again discussed and online graphics were utilized to demonstrate the surgery.  An informational packet with postoperative medications and instructions was provided. The pateints medical history and medications were reviewed. the appropriate medications for the patients to take were indicated. Ample time was allowed for all questions to be answered. At the end of the visit, all questions has been addressed thoroughly.   10/17/23: 1st post op. Transition to outpatient PT. Continue ASA X 2 more weeks spoke about abx dental ppx Follow up at 6 week adams  2023 MDP and celebrex GT bursitis  continue PT  2023 MRI of the left hip with MARS protocol to r/o nondisplaced fracture. fu after completion  2023 MRI of left hip w/o signs of fx GT bursal injection today  2023 MRI of the lumbar spine demonstrated impingment left >R continue PT for lower back Fu with PM ASAP for RADHA consideration  2024 doing better with RADHA. not 100% however is seeing Dr. Pittman back in . doing better with RADHA however no t100%, will see Dr. Sosa for eval gabapenitn 300 BID  2024 s/p discectomy c/b hematoma. has numbness in small and ring finger. w/o EMG evidence of neuropathy. lateral femoral cutaneous n. neuropathy gabpanentin 300 QHS  Time Based billin minutes was spent with the patient today taking the patient's history, conducting a physical examination, reviewing imaging studies, and  detailed discussion regarding the diagnosis and treatment plan.

## 2024-08-13 NOTE — HISTORY OF PRESENT ILLNESS
[Gradual] : gradual [3] : 3 [0] : 0 [Dull/Aching] : dull/aching [Occasional] : occasional [Walking] : walking [Retired] : Work status: retired [de-identified] : This is Mr. RAMON ALAS  a 66 year male who comes in today complaining of lower back and left hip pain for months with no injury.  Pain on the lateral aspect of the hip and lower back. Denies numbness/tingling. +heat, +Aleve.  11/22/2022 Mr. RAMON HINSON  here for eval of the left hip. Patient stated they feel worse  since their last visit. Patient mentioned having pain in the leg at the arch of the foot/calf/ thigh.   01/03/2023 Mr. RAMON HINSON  here for eval of the left hip. Patient stated his hip feels very good. States pain has mostly resolved.  No further pain down the leg.    03/07/2023 follow up left hip/ States he is feeling worse since last visit. it appears injection is wearing off.  08/15/2023 follow up left hip. States CSI helped temporarily. Did not last as long as the previous one.   09/12/2023 RAMON is here today for left hip follow up. pt states no changes in symptoms since last visit.  No significant pain. Ambulating with the cane.   11/30/23: no fevers or chills. pain in buttocks and hip. pain with ambulation. No numbness/tinglin. No relief with celebrex  12/05/23 here for a follow up to review mri results on the left hip, no changes since last visit  12/19/2023 Mr. RAMON ALAS JR is a 68 year male that comes in today for follow up left hip . pain is mostly in the lumbar spine and radiating to legs. doing much better after the epidural. not 100% but doing better. Following up with Dr. Pittman.   08/13/2024 Pt is here for his 1 year post op visit for Lt THR. Pt still reports soreness in the lateral hip and thigh.  [] : no [FreeTextEntry1] : left hip  [FreeTextEntry9] : hip injection  [de-identified] : mri done at [facility] [de-identified] : nothing

## 2024-08-13 NOTE — IMAGING
[de-identified] : Constitutional: well developed and well nourished, able to communicate Cardiovascular: Peripheral vascular exam is grossly normal Neurologic: Alert and oriented, no acute distress. Skin: normal skin with no ulcers, rashes, or lesions Pulmonary: No respiratory distress, breathing comfortably on room air Lymphatics: No obvious lymphadenopathy or lymphedema in areas examined  LUMBAR EXAM Alignment: normal Scoliosis: none Spinous process: no tenderness Thoracic paraspinal tenderness: no tenderness Lumbar Paraspinal tenderness: POS tenderness  RANGE OF MOTION full with pain  MOTOR EXAM Hip Flexion: 5 /5 Knee Extension: 5 /5 Knee Flexion: 5 /5 Ankle Dorsiflexion: 5 /5 Ankle plantar flexion: 5 /5 Toe Extension: 5 /5  TTP lower lumabr spine Straight leg sign: negative Sensation intact L2-S1 nerve root distribution Toes warm and well perfused paresthesias lateral femoral cutaneous n. distribution  [id number] Xrays of the lumbar spine and pelvis were taken demonstrating tonnis grade 3 b/l hips and L1/2 DDD with osteophytes and L5/S1 DDD

## 2024-08-24 ENCOUNTER — NON-APPOINTMENT (OUTPATIENT)
Age: 69
End: 2024-08-24

## 2024-08-27 ENCOUNTER — APPOINTMENT (OUTPATIENT)
Dept: ORTHOPEDIC SURGERY | Facility: CLINIC | Age: 69
End: 2024-08-27
Payer: MEDICARE

## 2024-08-27 DIAGNOSIS — Z98.890 OTHER SPECIFIED POSTPROCEDURAL STATES: ICD-10-CM

## 2024-08-27 PROCEDURE — 99214 OFFICE O/P EST MOD 30 MIN: CPT

## 2024-08-29 NOTE — ASSESSMENT
[FreeTextEntry1] : Almost 5 months s/p L4-5 left discectomy and evacuation of hematoma. MRI shows mild-moderate foraminal stenosis at L4-5 with postop Laminectomy & discectomy. Ed continues with soreness in left sided of low back into thigh.   - Referred to pain mgmt for Left L4-5 Transforaminal RADHA.  - F/up 2 weeks after injection.  * If no improvement with RADHA, will eval further possible L2 radicular symptoms. the MRI does show same side disc herniation.  symptoms are not classic left L2 however.  pending RADHA will tell a lot

## 2024-08-29 NOTE — IMAGING
[de-identified] : L Spine Inspection: Incision healed Palpation: No tenderness or spasms in b/l lumbar paraspinal musculature ROM: Full with some stiffness.  Strength: 5/5 b/l hip flexors, knee extensors, ankle dorsiflexors, EHL, ankle plantarflexors Neuro: Sensation LT I Negative b/l SLR Toe and heal walk intact Gait non antalgic

## 2024-08-29 NOTE — DATA REVIEWED
[MRI] : MRI [Lumbar Spine] : lumbar spine [Report was reviewed and noted in the chart] : The report was reviewed and noted in the chart [I independently reviewed and interpreted images and report] : I independently reviewed and interpreted images and report [FreeTextEntry1] : [facility] Spine MRI 8/13/24 1. Postop laminectomy and discectomy at L4-5 with mild foraminal stenosis  [facility] Spine MRI 12/14/23 1. L4-5 anterolisthesis, facet OA and posterior HNP with mild-moderate central stenosis and left L4,L5 root impingement with L>R foraminal stenosis.  2. L5-S1 anterolisthesis, facet OA and HNP with mild central stenosis and impingement of L>R L5.

## 2024-08-29 NOTE — HISTORY OF PRESENT ILLNESS
[0] : 0 [de-identified] : DOS: 3/27/24 Left L4-5 Discectomy;   8/27/24: Follow up L Spine. Persisting soreness in left sided of low back and soreness into L thigh. MRI review.   8/12/24: Follow up L Spine. ~8 months s/p left L4-5 discectomy. Persistent with left lower back soreness. Pain when getting up from sitting position. Seeing Dr. Infante tomorrow.   6/19/24: PO #3. Doing a bit better since last visit. Remains with some stiffness when sitting or lying down for prolonged periods of time. Resuming PT in the next couple of days.   5/7/24: about 6 weeks s/p discectomy 3/27/24 and laminectomy 3/29/24 @ [facility] for epidural hematoma. Preop leg symptoms are gone. stiffness and soreness in the back.  4/9/24: PO#1- L Spine. 2 weeks s/p discectomy. Doing well since sx. He was rushed back to ER 2 days after due to hematoma at sx site. Had 2nd surgery for a washup on 3/29. Feels soreness but doing much better. No pain. Ambulating w/o a cane.   02/17/24: Patient is a 68 year old male here for his lower back. States after having LT hip sx October 2nd, 2023 with Dr. Infante. States 2-3 weeks later he developed pain in the lower back had an MRI completed. Pain radiates down left leg to shin. Standing for prolonged hours worsens the pain, sitting is okay. Had 2 inj's L4-5, L5-S1 TFESI on 01/04/24 and 02/01/24 w/o any relief. Concerned for his future presentations since symptoms haven't improved since onset.  [] : no [FreeTextEntry5] : MRI REVIEW L SPINE

## 2024-08-29 NOTE — IMAGING
[de-identified] : L Spine Inspection: Incision healed Palpation: No tenderness or spasms in b/l lumbar paraspinal musculature ROM: Full with some stiffness.  Strength: 5/5 b/l hip flexors, knee extensors, ankle dorsiflexors, EHL, ankle plantarflexors Neuro: Sensation LT I Negative b/l SLR Toe and heal walk intact Gait non antalgic

## 2024-09-16 ENCOUNTER — APPOINTMENT (OUTPATIENT)
Dept: PAIN MANAGEMENT | Facility: CLINIC | Age: 69
End: 2024-09-16
Payer: MEDICARE

## 2024-09-16 VITALS — HEIGHT: 70 IN | BODY MASS INDEX: 25.77 KG/M2 | WEIGHT: 180 LBS

## 2024-09-16 DIAGNOSIS — M54.50 LOW BACK PAIN, UNSPECIFIED: ICD-10-CM

## 2024-09-16 DIAGNOSIS — Z98.890 OTHER SPECIFIED POSTPROCEDURAL STATES: ICD-10-CM

## 2024-09-16 DIAGNOSIS — M51.16 INTERVERTEBRAL DISC DISORDERS WITH RADICULOPATHY, LUMBAR REGION: ICD-10-CM

## 2024-09-16 PROCEDURE — 99214 OFFICE O/P EST MOD 30 MIN: CPT

## 2024-09-16 NOTE — PHYSICAL EXAM
[de-identified] : Constitutional; Appears well, no apparent distress Ability to communicate: Normal  Respiratory: non-labored breathing Skin: No rash noted Head: Normocephalic, atraumatic Neck: no visible thyroid enlargement Eyes: Extraocular movements intact Neurologic: Alert and oriented x3 Psychiatric: normal mood, affect and behavior [] : non-antalgic

## 2024-09-16 NOTE — DISCUSSION/SUMMARY
[de-identified] : After discussing various treatment options with the patient including but not limited to oral medications, physical therapy, exercise modalities as well as interventional spinal injections, we have decided with the following plan:  - Continue Home exercises, stretching, activity modification, physical therapy, and conservative care. - MRI report and/or images was reviewed and discussed with the patient. - Recommend Left L4-5 Transforaminal Epidural Steroid Injection under fluoroscopic guidance with image. - The risks, benefits and alternatives of the proposed procedure were explained in detail with the patient. The risks outlined include but are not limited to infection, bleeding, post-dural puncture headache, nerve injury, a temporary increase in pain, failure to resolve symptoms, allergic reaction, symptom recurrence, and possible elevation of blood sugar in diabetics. All questions were answered to patient's apparent satisfaction and he/she verbalized an understanding. - Patient is presenting with acute/sub-acute radicular pain with impairment in ADLs and functionality.  The pain has not responded to conservative care including NSAID therapy and/or physical therapy.  There is no bleeding tendency, unstable medical condition, or systemic infection. - Follow up in 1-2 weeks post injection for re-evaluation.

## 2024-09-16 NOTE — HISTORY OF PRESENT ILLNESS
[Lower back] : lower back [9] : 9 [2] : 2 [Dull/Aching] : dull/aching [Sharp] : sharp [Shooting] : shooting [Constant] : constant [Household chores] : household chores [Sleep] : sleep [Nothing helps with pain getting better] : Nothing helps with pain getting better [Standing] : standing [Walking] : walking [FreeTextEntry1] : 09/16/2024: s/p L4-5 discectomy in March 2024 with Dr. Sosa and then a laminectomy a few days after. Now having soreness on the left lower back and radiates to the left lateral thigh. Saw Dr. Sosa who recommended LEFT L4-5 TFESI.  Also complaining of tingling in the b/l hands which started after the laminectomy.   MRI 8/13/24 independently reviewed: Postop laminectomy and discectomy at L4-5 with mild foraminal stenosis  02/16/2024: s/p L4-5 LESI on 02/01/24 with no relief and improvement of ADLs. Tried acupuncture, NSAIDs, PT with no relief. Gabapentin and Mobic with no relief.   01/19/2024: s/p LEFT L4-5 L5-S1 TFESI on 01/04/24 with little relief. Still radiating down the left leg to the shin.   Initial HPI 12/21/2023: S/p left AMAURY with Dr. Infante on 10/2/23 and was feeling great until 3 weeks after the surgery when started having left lower back pain radiating to the left lateral hip and anterior shin described as a sharp shooting burning pain worse with standing for long periods. Saw Dr. Infatne who recommended pain mgt for possible RADHA.   MRI Lumbar Spine 12/14/23 independently reviewed: L4-5 spondy and HNP with left L4, L5 impingement.  Conservative Care: has been doing PT  Pain Medications: tylenol PRN.  Past Injections: none Spine surgery: none  Blood thinners: none  [] : no [FreeTextEntry7] : lt leg

## 2024-09-16 NOTE — PHYSICAL EXAM
[de-identified] : Constitutional; Appears well, no apparent distress Ability to communicate: Normal  Respiratory: non-labored breathing Skin: No rash noted Head: Normocephalic, atraumatic Neck: no visible thyroid enlargement Eyes: Extraocular movements intact Neurologic: Alert and oriented x3 Psychiatric: normal mood, affect and behavior [] : non-antalgic

## 2024-09-16 NOTE — HISTORY OF PRESENT ILLNESS
[Lower back] : lower back [9] : 9 [2] : 2 [Dull/Aching] : dull/aching [Sharp] : sharp [Shooting] : shooting [Constant] : constant [Household chores] : household chores [Sleep] : sleep [Nothing helps with pain getting better] : Nothing helps with pain getting better [Standing] : standing [Walking] : walking [FreeTextEntry1] : 09/16/2024: s/p L4-5 discectomy in March 2024 with Dr. Sosa and then a laminectomy a few days after. Now having soreness on the left lower back and radiates to the left lateral thigh. Saw Dr. Sosa who recommended LEFT L4-5 TFESI.  Also complaining of tingling in the b/l hands which started after the laminectomy.   MRI 8/13/24 independently reviewed: Postop laminectomy and discectomy at L4-5 with mild foraminal stenosis  02/16/2024: s/p L4-5 LESI on 02/01/24 with no relief and improvement of ADLs. Tried acupuncture, NSAIDs, PT with no relief. Gabapentin and Mobic with no relief.   01/19/2024: s/p LEFT L4-5 L5-S1 TFESI on 01/04/24 with little relief. Still radiating down the left leg to the shin.   Initial HPI 12/21/2023: S/p left AMAURY with Dr. Infante on 10/2/23 and was feeling great until 3 weeks after the surgery when started having left lower back pain radiating to the left lateral hip and anterior shin described as a sharp shooting burning pain worse with standing for long periods. Saw Dr. Infante who recommended pain mgt for possible RADHA.   MRI Lumbar Spine 12/14/23 independently reviewed: L4-5 spondy and HNP with left L4, L5 impingement.  Conservative Care: has been doing PT  Pain Medications: tylenol PRN.  Past Injections: none Spine surgery: none  Blood thinners: none  [] : no [FreeTextEntry7] : lt leg

## 2024-09-16 NOTE — DISCUSSION/SUMMARY
[de-identified] : After discussing various treatment options with the patient including but not limited to oral medications, physical therapy, exercise modalities as well as interventional spinal injections, we have decided with the following plan:  - Continue Home exercises, stretching, activity modification, physical therapy, and conservative care. - MRI report and/or images was reviewed and discussed with the patient. - Recommend Left L4-5 Transforaminal Epidural Steroid Injection under fluoroscopic guidance with image. - The risks, benefits and alternatives of the proposed procedure were explained in detail with the patient. The risks outlined include but are not limited to infection, bleeding, post-dural puncture headache, nerve injury, a temporary increase in pain, failure to resolve symptoms, allergic reaction, symptom recurrence, and possible elevation of blood sugar in diabetics. All questions were answered to patient's apparent satisfaction and he/she verbalized an understanding. - Patient is presenting with acute/sub-acute radicular pain with impairment in ADLs and functionality.  The pain has not responded to conservative care including NSAID therapy and/or physical therapy.  There is no bleeding tendency, unstable medical condition, or systemic infection. - Follow up in 1-2 weeks post injection for re-evaluation.

## 2024-09-19 ENCOUNTER — APPOINTMENT (OUTPATIENT)
Dept: PAIN MANAGEMENT | Facility: CLINIC | Age: 69
End: 2024-09-19
Payer: MEDICARE

## 2024-09-19 DIAGNOSIS — M54.17 RADICULOPATHY, LUMBOSACRAL REGION: ICD-10-CM

## 2024-09-19 PROCEDURE — 64483 NJX AA&/STRD TFRM EPI L/S 1: CPT | Mod: LT

## 2024-09-19 PROCEDURE — J3490M: CUSTOM | Mod: NC

## 2024-09-19 NOTE — PROCEDURE
[FreeTextEntry3] : Date of Service: 09/19/2024   Account: [id number]  Patient: RAMON ALAS JR   YOB: 1955  Age: 68 year   Surgeon:                                                         Joe Pittman D.O.   Assistant:                                                        None  Pre-Operative Diagnosis:                            Lumbosacral radiculitis (M54.17)  Post-Operative Diagnosis:                          Same  Procedure:                                                     Left L4-5 transforaminal epidural steroid injection under fluoroscopic guidance.  Anesthesia:                                                    Local with MAC   This procedure was carried out using fluoroscopic guidance.  The risks and benefits of the procedure were discussed extensively with the patient.  The consent of the patient was obtained and the following procedure was performed. The patient was placed in the prone position on the fluoroscopic table and the lumbar area was prepped and draped in a sterile fashion. A timeout was performed with all essential staff present and the site and side were verified.  The left L4-5 neural foramen was identified on right oblique "concetta dog" anatomical view at the 6 o'clock position using fluoroscopic guidance, and the area was marked. The overlying skin and subcutaneous structures were anesthetized using sterile technique with 1% Lidocaine.  A 22-gauge spinal needle was directed toward the inferior (6 o'clock) position of the pedicle, which formed the roof of the identified foramen.  Once in the epidural space, after negative aspiration for heme and CSF, 1cc of Omnipaque contrast was injected under live fluoroscopy to confirm epidural location and assess filling defects and rule out intravascular needle placement.   The following epidurogram observed: no intravascular or intrathecal flow pattern was noted.  No blood or CSF was aspirated. Contrast spread medially in epidural space.    After this, an injectate of 1 cc preservative free normal saline plus 6 mg of betamethasone and 1 cc of 0.25% bupivacaine was injected in the epidural space.  The needle was subsequently removed.  Vital signs remained normal.  Pulse oximeter was used throughout the procedure and the patient's pulse and oxygen saturation remained within normal limits.  The patient tolerated the procedure well.  There were no complications.  The patient was instructed to apply ice over the injection sites for twenty minutes every two hours for the next 24 to 48 hours.  The patient was also instructed to contact me immediately if there were any problems.  Joe Pittman D.O.

## (undated) DEVICE — DRAPE 3/4 SHEET W REINFORCEMENT 56X77"

## (undated) DEVICE — PACK POSTERIOR SPINAL FUSION

## (undated) DEVICE — GOWN IMPERV BREATHABLE XL

## (undated) DEVICE — ELCTR AQUAMANTYS BIPOLAR SEALER 6.0

## (undated) DEVICE — FRA-ESU BOVIE FORCE TRIAD T6D04548DX: Type: DURABLE MEDICAL EQUIPMENT

## (undated) DEVICE — DRAPE SPLIT SHEET 77" X 120"

## (undated) DEVICE — GLV 7 PROTEXIS ORTHO (BROWN)

## (undated) DEVICE — SAW BLADE STRYKER SAGITTAL PERFORMANCE SERIES 25X1.19X90MM

## (undated) DEVICE — SUT VICRYL 2-0 18" CP-2 UNDYED (POP-OFF)

## (undated) DEVICE — DRAPE TOWEL BLUE 17" X 24"

## (undated) DEVICE — DRSG CURITY GAUZE SPONGE 4 X 4" 12-PLY

## (undated) DEVICE — ZIMMER PULSAVAC PLUS FAN KIT

## (undated) DEVICE — TUBING BIPOLAR IRRIGATOR AND CORD SET

## (undated) DEVICE — DRSG XEROFORM 5 X 9"

## (undated) DEVICE — HOOD T5 PEELAWAY

## (undated) DEVICE — VENODYNE/SCD SLEEVE CALF MEDIUM

## (undated) DEVICE — DRAPE INSTRUMENT POUCH 6.75" X 11"

## (undated) DEVICE — SUCTION YANKAUER TAPERED BULBOUS NO VENT

## (undated) DEVICE — DRSG DERMABOND PRINEO 22CM

## (undated) DEVICE — MAKO DRAPE KIT

## (undated) DEVICE — GLV 7.5 PROTEXIS (WHITE)

## (undated) DEVICE — DRSG KERLIX ROLL 4.5"

## (undated) DEVICE — BLADE SURGICAL #11 CARBON

## (undated) DEVICE — SUT HISTOACRYL BLUE

## (undated) DEVICE — DRAPE MAYO STAND 30"

## (undated) DEVICE — MARKING PEN W RULER

## (undated) DEVICE — POSITIONER PATIENT SAFETY STRAP 3X60"

## (undated) DEVICE — DRSG AQUACEL 3.5 X 10"

## (undated) DEVICE — PACK SPINE

## (undated) DEVICE — ELCTR BOVIE TIP BLADE INSULATED 6.5" EDGE

## (undated) DEVICE — FRA-ESU BOVIE FORCE TRIAD T7J19717DX: Type: DURABLE MEDICAL EQUIPMENT

## (undated) DEVICE — GLV 8 PROTEXIS (BLUE)

## (undated) DEVICE — DRSG 4 X 4" 4PLY STERILE

## (undated) DEVICE — Device

## (undated) DEVICE — DRAPE SHOWER CURTAIN ISOLATION

## (undated) DEVICE — ELCTR BOVIE TIP BLADE INSULATED 4" EDGE

## (undated) DEVICE — SUT VICRYL 2-0 27" CT-2 UNDYED

## (undated) DEVICE — WARMING BLANKET UPPER ADULT

## (undated) DEVICE — POSITIONER FOAM LAMINECTOMY ARM CRADLE (PINK)

## (undated) DEVICE — DRSG TEGADERM 4X4.75"

## (undated) DEVICE — SYR LUER LOK 50CC

## (undated) DEVICE — MAKO VIZADISC KNEE TRACKING KIT

## (undated) DEVICE — SUT VICRYL 4-0 27" PS-2 UNDYED

## (undated) DEVICE — MEDICATION LABELS W MARKER

## (undated) DEVICE — DRAPE IOBAN 23" X 17"

## (undated) DEVICE — POSITIONER FOAM HEAD DONUT 9" (PINK)

## (undated) DEVICE — PREP CHLORAPREP HI-LITE ORANGE 26ML

## (undated) DEVICE — SUT STRATAFIX SPIRAL MONOCRYL PLUS 4-0 45CM PS-2 UNDYED

## (undated) DEVICE — DRAPE C ARM 41X140"

## (undated) DEVICE — POSITIONER HANA PATIENT CARE KIT POSITION SUPINE

## (undated) DEVICE — ELCTR BOVIE TIP BLADE INSULATED 2.75" EDGE WITH SAFETY

## (undated) DEVICE — NDL SPINAL 18G X 3.5" (PINK)

## (undated) DEVICE — GLV 7 PROTEXIS (BLUE)

## (undated) DEVICE — SUT ETHIBOND 5 4-30" V-37

## (undated) DEVICE — NDL HYPO SAFE 20G X 1.5" (YELLOW)

## (undated) DEVICE — DRAPE U 47X51" LF STERILE

## (undated) DEVICE — SUT VLOC 90 4-0 23" P-12 UNDYED

## (undated) DEVICE — SUT VICRYL 1 18" CT-1 UNDYED (POP-OFF)

## (undated) DEVICE — ELCTR STRYKER NEPTUNE SMOKE EVACUATION PENCIL (GREEN)

## (undated) DEVICE — DRAPE SURGICAL #1010

## (undated) DEVICE — MIDAS REX LEGEND MATCH HEAD FLUTED LG BORE 3.0MM X 14CM

## (undated) DEVICE — MIDAS REX MR7 LUBRICANT DIFFUSER CARTRIDGE

## (undated) DEVICE — FRAZIER SUCTION TIP 10FR

## (undated) DEVICE — SUT STRATAFIX SYMMETRIC PDS PLUS 1 18" CTX VIOLET

## (undated) DEVICE — SUT VICRYL 0 18" CT-1 UNDYED (POP-OFF)

## (undated) DEVICE — BIPOLAR FORCEP HENSLER BAYONET 10" X 1MM (YELLOW)

## (undated) DEVICE — POSITIONER CUSHION INSERT PRONE VIEW LG

## (undated) DEVICE — SOL IRR POUR NS 0.9% 1000ML

## (undated) DEVICE — GLV 7 PROTEXIS (WHITE)

## (undated) DEVICE — SOL BETADINE POUCH 0.75OZ STERILE

## (undated) DEVICE — DRSG EYE PAD OVAL STERILE